# Patient Record
Sex: MALE | Race: WHITE | NOT HISPANIC OR LATINO | URBAN - METROPOLITAN AREA
[De-identification: names, ages, dates, MRNs, and addresses within clinical notes are randomized per-mention and may not be internally consistent; named-entity substitution may affect disease eponyms.]

---

## 2017-02-20 ENCOUNTER — COMMUNICATION - HEALTHEAST (OUTPATIENT)
Dept: INTERNAL MEDICINE | Facility: CLINIC | Age: 77
End: 2017-02-20

## 2017-02-20 DIAGNOSIS — I10 HYPERTENSION: ICD-10-CM

## 2017-05-03 ENCOUNTER — OFFICE VISIT - HEALTHEAST (OUTPATIENT)
Dept: INTERNAL MEDICINE | Facility: CLINIC | Age: 77
End: 2017-05-03

## 2017-05-03 DIAGNOSIS — R91.1 INCIDENTAL LUNG NODULE, > 3MM AND < 8MM: ICD-10-CM

## 2017-05-03 DIAGNOSIS — M79.89 LEG SWELLING: ICD-10-CM

## 2017-05-03 DIAGNOSIS — G25.9 MOVEMENT DISORDER: ICD-10-CM

## 2017-05-03 DIAGNOSIS — I10 ESSENTIAL HYPERTENSION: ICD-10-CM

## 2017-05-03 ASSESSMENT — MIFFLIN-ST. JEOR: SCORE: 1742.51

## 2017-05-04 ENCOUNTER — COMMUNICATION - HEALTHEAST (OUTPATIENT)
Dept: INTERNAL MEDICINE | Facility: CLINIC | Age: 77
End: 2017-05-04

## 2017-06-13 ENCOUNTER — COMMUNICATION - HEALTHEAST (OUTPATIENT)
Dept: INTERNAL MEDICINE | Facility: CLINIC | Age: 77
End: 2017-06-13

## 2017-06-13 DIAGNOSIS — I10 HYPERTENSION: ICD-10-CM

## 2017-08-10 ENCOUNTER — RECORDS - HEALTHEAST (OUTPATIENT)
Dept: ADMINISTRATIVE | Facility: OTHER | Age: 77
End: 2017-08-10

## 2017-08-15 ENCOUNTER — COMMUNICATION - HEALTHEAST (OUTPATIENT)
Dept: INTERNAL MEDICINE | Facility: CLINIC | Age: 77
End: 2017-08-15

## 2017-08-15 DIAGNOSIS — G25.9 MOVEMENT DISORDER: ICD-10-CM

## 2017-09-05 ENCOUNTER — OFFICE VISIT (OUTPATIENT)
Dept: NEUROLOGY | Facility: CLINIC | Age: 77
End: 2017-09-05

## 2017-09-05 ENCOUNTER — RECORDS - HEALTHEAST (OUTPATIENT)
Dept: ADMINISTRATIVE | Facility: OTHER | Age: 77
End: 2017-09-05

## 2017-09-05 VITALS — RESPIRATION RATE: 18 BRPM | DIASTOLIC BLOOD PRESSURE: 85 MMHG | SYSTOLIC BLOOD PRESSURE: 136 MMHG | HEART RATE: 69 BPM

## 2017-09-05 DIAGNOSIS — G25.9 EXTRAPYRAMIDAL DISORDER: Primary | ICD-10-CM

## 2017-09-05 DIAGNOSIS — R41.3 MEMORY LOSS: ICD-10-CM

## 2017-09-05 RX ORDER — AMLODIPINE BESYLATE 10 MG/1
TABLET ORAL
COMMUNITY
Start: 2017-06-19

## 2017-09-05 RX ORDER — POLYETHYLENE GLYCOL 3350 17 G/17G
17 POWDER, FOR SOLUTION ORAL
COMMUNITY
Start: 2015-09-19

## 2017-09-05 RX ORDER — HYDROCHLOROTHIAZIDE 25 MG/1
25 TABLET ORAL
COMMUNITY
Start: 2017-05-03

## 2017-09-05 ASSESSMENT — PAIN SCALES - GENERAL: PAINLEVEL: NO PAIN (0)

## 2017-09-05 NOTE — MR AVS SNAPSHOT
After Visit Summary   9/5/2017    Sánchez Castellanos    MRN: [id number]           Patient Information     Date Of Birth          1940        Visit Information        Provider Department      9/5/2017 1:30 PM Steven Bañuelos MD [facility]        Today's Diagnoses     Extrapyramidal disorder    -  1    Memory loss           Follow-ups after your visit        Follow-up notes from your care team     Discussed this visit Return in about 2 weeks (around 9/19/2017).      Your next 10 appointments already scheduled     Sep 19, 2017 11:30 AM CDT   Return Visit with Steven Bañuelos MD   [facility] ([facility])    [address]  [facility] [address] [phone]              Future tests that were ordered for you today     Open Future Orders        Priority Expected Expires Ordered    Vitamin B12 Routine 9/10/2017 9/5/2018 9/5/2017    Methylmalonic acid Routine 9/10/2017 9/5/2018 9/5/2017    Hepatic panel Routine 9/10/2017 9/5/2018 9/5/2017    MR Brain w/o Contrast Routine 9/12/2017 9/5/2018 9/5/2017            Who to contact     Please call your clinic at [phone] to:    Ask questions about your health    Make or cancel appointments    Discuss your medicines    Learn about your test results    Speak to your doctor   If you have compliments or concerns about an experience at your clinic, or if you wish to file a complaint, please contact [facility] Patient Relations at [phone] or email us at [email or web address]         Additional Information About Your Visit        MyChart Information     [employer] is an electronic gateway that provides easy, online access to your medical records. With [employer], you can request a clinic appointment, read your test results, renew a prescription or communicate with your care team.     To sign up for [employer]  visit the website at [email or web address]   You will be asked to enter the access code listed below, as well as some personal information. Please follow the directions to create your username and password.     Your access code is: [email or web address]  Expires: 2017  2:03 PM     Your access code will  in 90 days. If you need help or a new code, please contact your [facility] or call [phone] for assistance.        Care EveryWhere ID     This is your Care EveryWhere ID. This could be used by other organizations to access your [place] medical records  [id number]        Your Vitals Were     Pulse Respirations                69 18           Blood Pressure from Last 3 Encounters:   17 136/85    Weight from Last 3 Encounters:   No data found for Wt              We Performed the Following     TSH with free T4 reflex        Primary Care Provider Office Phone # Fax #    Telly MICHELLE Gold,  [phone]       [address] [address]        Equal Access to Services     : Hadii aad ku hadasho Soomaali, waaxda luqadaha, qaybta kaalmada adeegyada, waxay idiin hayaan chrissy solorio . So [facility] [phone].    ATENCIÓN: Si habla español, tiene a calzada disposición servicios gratuitos de asistencia lingüística. Llame al [phone].    We comply with applicable federal civil rights laws and [place] laws. We do not discriminate on the basis of race, color, national origin, age, disability sex, sexual orientation or gender identity.            Thank you!     Thank you for choosing [facility]  for your care. Our goal is always to provide you with excellent care. Hearing back from our patients is one way we can continue to improve our services. Please take a few minutes to complete the written survey that you may receive in the mail after your visit with us. Thank  you!             Your Updated Medication List - Protect others around you: Learn how to safely use, store and throw away your medicines at www.disposemymeds.org.          This list is accurate as of: 9/5/17  2:19 PM.  Always use your most recent med list.                   Brand Name Dispense Instructions for use Diagnosis    amLODIPine 10 MG tablet    NORVASC     TAKE ONE TABLET BY MOUTH DAILY    Extrapyramidal disorder, Memory loss       hydrochlorothiazide 25 MG tablet    HYDRODIURIL     Take 25 mg by mouth    Extrapyramidal disorder, Memory loss       polyethylene glycol Packet    MIRALAX/GLYCOLAX     Take 17 g by mouth    Extrapyramidal disorder, Memory loss

## 2017-09-05 NOTE — PROGRESS NOTES
"2017         Telly Kevin MD   [facility]   [address] [place] [address]      RE: Sánchez Castellanos   MRN: [id number]   : 1940      Dear Dr. Kevin:      Thank you for referring Sánchez Castellanos.  Mr. Castellanos is a 76-year-old male referred for evaluation of possible Parkinson's disease.      He indicates he has had trouble walking over the last 2-3 years.  He describes his gait as somewhat shuffling.  His biggest problem is turning, which he finds very difficult.  He has had occasional falls.  He has not appreciated a tremor.  He does report some left leg dysfunction that developed with leg swelling but has persisted since the swelling went down.  Initially told me he had no trouble with dexterity, but later indicated that he has some problems with his left hand dexterity.  He has had no hallucinations or delusions.  He denies anything that might represent a REM sleep behavioral disturbance.  He is not on any medications that might produce Parkinsonism.  He denies any trouble with bowel or bladder control.      He tells me his memory is not as sharp as it once was.      He has not had much in the way of workup, although he did have a normal basic metabolic panel in May.      His past history is notable for hypertension and a lung nodule which is being followed.      CURRENT MEDICATIONS:   1.  Amlodipine.   2.  Hydrochlorothiazide.   3.  MiraLax.      ALLERGIES:  He denies any medical allergies.      He is not aware of any family history of Parkinson's disease.      He is retired.  He smokes 3 cigars a day.  He does not use alcohol.      Examination reveals the patient's heart rate is 69.  Blood pressure 136/85.      He is alert.  He tends to move slowly.  He knows he is on the third floor of our building and remembered my last name began with an H.  He is otherwise oriented to place and time.  He made 1 error spelling the word \"world\" backward.  He recalled 2/3 objects.      His pupils " are miotic, measuring 2-3 mm, but both react.  Fundi are unremarkable.  He has full extraocular motility, but somewhat irregular pursuits.  There is no nystagmus.  Otherwise, cranial nerves II-XII are intact.  Motor examination reveals intact strength throughout.  On sensory testing, he has absent vibratory sense in the toes.  Position sense is intact.  He did not extinguish double simultaneous sensory stimuli.      The patient does have a facial stare and a furrowed brow.  He has reduced left hand dexterity with finger tapping movements.  He does not have a tremor either at rest or with postural maintenance or action.  He does have some slight cogwheeling in both arms, more so on the left than on the right.  He has increased tone in his neck.  Finger-to-nose is done accurately.  His gait is a bit complex.  He is not narrow-based.  He does tend to slightly favor the left leg.  He takes short steps.  He does not have a good arm swing bilaterally.  It took him 5 steps to turn.      Reflexes are 2+.  Plantar responses are difficult to  but the left may be extensor.      IMPRESSION:  Parkinsonism.      PLAN:  I explained to the patient and his wife, Ana, that he has findings of an extrapyramidal disorder, but a diagnosis of idiopathic Parkinson's disease cannot clearly be made.  I discussed that there are other conditions that can produce this picture and I felt that further evaluation was warranted.      I recommended a brain MRI scan.  He is not certain he wants to pursue that, but he did allow us to go ahead and schedule it today.      I am also going to check some blood work which will include a TSH, B12, methylmalonic acid level, and hepatic profile.      I plan to see him back in 2 weeks to review the available test results.     ADDENDUM 9/6/17: Patient called. He cancelled testing and follow up appointment.     Sincerely,         MD ANNA Carpenter MD             D: 09/05/2017 14:20    T: 2017 14:38   MT: al      Name:     DOMINGA BLANCO   MRN:      [id number]        Account:      [id number]   :      1940           Service Date: 2017      Document: [id number]

## 2017-09-05 NOTE — LETTER
2017       RE: Sánchez Castellanos  [address] [address]     Dear Colleague,    Thank you for referring your patient, Sánchez Castellanos, to the [facility] at [facility]. Please see a copy of my visit note below.    2017         Telly Kevin MD   [facility]   [address] [place] [address]      RE: Sánchez Castellanos   MRN: [id number]   : 1940      Dear Dr. Kevin:      Thank you for referring Sánchez Castellanos.  Mr. Castellanos is a 76-year-old male referred for evaluation of possible Parkinson's disease.      He indicates he has had trouble walking over the last 2-3 years.  He describes his gait as somewhat shuffling.  His biggest problem is turning, which he finds very difficult.  He has had occasional falls.  He has not appreciated a tremor.  He does report some left leg dysfunction that developed with leg swelling but has persisted since the swelling went down.  Initially told me he had no trouble with dexterity, but later indicated that he has some problems with his left hand dexterity.  He has had no hallucinations or delusions.  He denies anything that might represent a REM sleep behavioral disturbance.  He is not on any medications that might produce Parkinsonism.  He denies any trouble with bowel or bladder control.      He tells me his memory is not as sharp as it once was.      He has not had much in the way of workup, although he did have a normal basic metabolic panel in May.      His past history is notable for hypertension and a lung nodule which is being followed.      CURRENT MEDICATIONS:   1.  Amlodipine.   2.  Hydrochlorothiazide.   3.  MiraLax.      ALLERGIES:  He denies any medical allergies.      He is not aware of any family history of Parkinson's disease.      He is retired.  He smokes 3 cigars a day.  He does not use alcohol.      Examination reveals the patient's heart rate is 69.   "Blood pressure 136/85.      He is alert.  He tends to move slowly.  He knows he is on the third floor of our building and remembered my last name began with an H.  He is otherwise oriented to place and time.  He made 1 error spelling the word \"world\" backward.  He recalled 2/3 objects.      His pupils are miotic, measuring 2-3 mm, but both react.  Fundi are unremarkable.  He has full extraocular motility, but somewhat irregular pursuits.  There is no nystagmus.  Otherwise, cranial nerves II-XII are intact.  Motor examination reveals intact strength throughout.  On sensory testing, he has absent vibratory sense in the toes.  Position sense is intact.  He did not extinguish double simultaneous sensory stimuli.      The patient does have a facial stare and a furrowed brow.  He has reduced left hand dexterity with finger tapping movements.  He does not have a tremor either at rest or with postural maintenance or action.  He does have some slight cogwheeling in both arms, more so on the left than on the right.  He has increased tone in his neck.  Finger-to-nose is done accurately.  His gait is a bit complex.  He is not narrow-based.  He does tend to slightly favor the left leg.  He takes short steps.  He does not have a good arm swing bilaterally.  It took him 5 steps to turn.      Reflexes are 2+.  Plantar responses are difficult to  but the left may be extensor.      IMPRESSION:  Parkinsonism.      PLAN:  I explained to the patient and his wife, Ana, that he has findings of an extrapyramidal disorder, but a diagnosis of idiopathic Parkinson's disease cannot clearly be made.  I discussed that there are other conditions that can produce this picture and I felt that further evaluation was warranted.      I recommended a brain MRI scan.  He is not certain he wants to pursue that, but he did allow us to go ahead and schedule it today.      I am also going to check some blood work which will include a TSH, B12, " methylmalonic acid level, and hepatic profile.      I plan to see him back in 2 weeks to review the available test results.      Sincerely,         Steven Bañuelos MD            D: 2017 14:20   T: 2017 14:38   MT: al      Name:     DOMINGA BLANCO   MRN:      [id number]        Account:      [id number]   :      1940           Service Date: 2017      Document: [id number]

## 2017-09-14 ENCOUNTER — OFFICE VISIT (OUTPATIENT)
Dept: NEUROLOGY | Facility: CLINIC | Age: 77
End: 2017-09-14
Payer: COMMERCIAL

## 2017-09-14 ENCOUNTER — RECORDS - HEALTHEAST (OUTPATIENT)
Dept: ADMINISTRATIVE | Facility: OTHER | Age: 77
End: 2017-09-14

## 2017-09-14 VITALS
RESPIRATION RATE: 16 BRPM | HEART RATE: 72 BPM | WEIGHT: 189 LBS | SYSTOLIC BLOOD PRESSURE: 128 MMHG | TEMPERATURE: 97.7 F | DIASTOLIC BLOOD PRESSURE: 78 MMHG | OXYGEN SATURATION: 97 %

## 2017-09-14 DIAGNOSIS — G20.C PARKINSONISM, UNSPECIFIED PARKINSONISM TYPE (H): Primary | ICD-10-CM

## 2017-09-14 DIAGNOSIS — E53.8 VITAMIN B12 DEFICIENCY (NON ANEMIC): ICD-10-CM

## 2017-09-14 LAB — VIT B12 SERPL-MCNC: 146 PG/ML (ref 193–986)

## 2017-09-14 PROCEDURE — 80076 HEPATIC FUNCTION PANEL: CPT | Performed by: PSYCHIATRY & NEUROLOGY

## 2017-09-14 PROCEDURE — 84443 ASSAY THYROID STIM HORMONE: CPT | Performed by: PSYCHIATRY & NEUROLOGY

## 2017-09-14 PROCEDURE — 36415 COLL VENOUS BLD VENIPUNCTURE: CPT | Performed by: PSYCHIATRY & NEUROLOGY

## 2017-09-14 PROCEDURE — 83921 ORGANIC ACID SINGLE QUANT: CPT | Mod: 90 | Performed by: PSYCHIATRY & NEUROLOGY

## 2017-09-14 PROCEDURE — 99000 SPECIMEN HANDLING OFFICE-LAB: CPT | Performed by: PSYCHIATRY & NEUROLOGY

## 2017-09-14 PROCEDURE — 82607 VITAMIN B-12: CPT | Performed by: PSYCHIATRY & NEUROLOGY

## 2017-09-14 PROCEDURE — 82390 ASSAY OF CERULOPLASMIN: CPT | Performed by: PSYCHIATRY & NEUROLOGY

## 2017-09-14 PROCEDURE — 99205 OFFICE O/P NEW HI 60 MIN: CPT | Performed by: PSYCHIATRY & NEUROLOGY

## 2017-09-14 NOTE — MR AVS SNAPSHOT
After Visit Summary   9/14/2017    Sánchez Castellanos    MRN: [id number]           Patient Information     Date Of Birth          1940        Visit Information        Provider Department      9/14/2017 1:30 PM Andrés Panda MD [facility]        Today's Diagnoses     Parkinsonism, unspecified Parkinsonism type (H)    -  1      Care Instructions    AFTER VISIT SUMMARY (AVS):    At today's visit we discussed various diagnostic possibilities for your symptoms and the reasons for work-up, which includes:  Orders Placed This Encounter   Procedures     MR Brain w/o Contrast     TSH with free T4 reflex     Vitamin B12     Methylmalonic acid     Ceruloplasmin     Hepatic panel (Albumin, ALT, AST, Bili, Alk Phos, TP)     No new medications were ordered.    Preventive Neurology: Encouraged to stay physically and mentally active with particular emphasis on daily mentally stimulating activities of your choice (such as crosswords, puzzles, sudoku, etc.), stretching exercises, walking, and healthy eating.    Additional recommendations after the work-up.    Next follow-up appointment is in next 2-4 weeks or earlier if needed.    Please do not hesitate to call me with any questions or concerns.    Thanks.            Follow-ups after your visit        Your next 10 appointments already scheduled     Sep 23, 2017  1:00 PM CDT   (Arrive by 12:45 PM)   MR BRAIN W/O CONTRAST with [address] [facility] ([facility])    [address] [place] [address] [phone]           Take your medicines as usual, unless your doctor tells you not to. Bring a list of your current medicines to your exam (including vitamins, minerals and over-the-counter drugs). Also bring the results of similar scans you may have had.  Please remove any body piercings and hair extensions before you arrive.  Follow your doctor s orders. If you do not, we  may have to postpone your exam.  You will not have contrast for this exam. You do not need to do anything special to prepare.  The MRI machine uses a strong magnet. Please wear clothes without metal (snaps, zippers). A sweatsuit works well, or we may give you a hospital gown.   **IMPORTANT** THE INSTRUCTIONS BELOW ARE ONLY FOR THOSE PATIENTS WHO HAVE BEEN TOLD THEY WILL RECEIVE SEDATION OR GENERAL ANESTHESIA DURING THEIR MRI PROCEDURE:  IF YOU WILL RECEIVE SEDATION (take medicine to help you relax during your exam):   You must get the medicine from your doctor before you arrive. Bring the medicine to the exam. Do not take it at home.   Arrive one hour early. Bring someone who can take you home after the test. Your medicine will make you sleepy. After the exam, you may not drive, take a bus or take a taxi by yourself.   No eating 8 hours before your exam. You may have clear liquids up until 4 hours before your exam. (Clear liquids include water, clear tea, black coffee and fruit juice without pulp.)  IF YOU WILL RECEIVE ANESTHESIA (be asleep for your exam):   Arrive 1 1/2 hours early. Bring someone who can take you home after the test. You may not drive, take a bus or take a taxi by yourself.   No eating 8 hours before your exam. You may have clear liquids up until 4 hours before your exam. (Clear liquids include water, clear tea, black coffee and fruit juice without pulp.)   You will spend four to five hours in the recovery room.  Please call the Imaging Department at your exam site with any questions.            Oct 05, 2017  1:00 PM CDT   Return Visit with Andrés Panda MD   [facility] ([facility])    [address] [address] [phone]              Future tests that were ordered for you today     Open Future Orders        Priority Expected Expires Ordered    MR Brain w/o Contrast Routine  9/14/2018 9/14/2017            Who to contact   "   If you have questions or need follow up information about today's clinic visit or your schedule please contact [facility] directly at [phone].  Normal or non-critical lab and imaging results will be communicated to you by MyChart, letter or phone within 4 business days after the clinic has received the results. If you do not hear from us within 7 days, please contact the clinic through [employer] or phone. If you have a critical or abnormal lab result, we will notify you by phone as soon as possible.  Submit refill requests through [employer] or call your pharmacy and they will forward the refill request to us. Please allow 3 business days for your refill to be completed.          Additional Information About Your Visit        [employer] Information     [employer] lets you send messages to your doctor, view your test results, renew your prescriptions, schedule appointments and more. To sign up, go to [email or web address] . Click on \"Log in\" on the left side of the screen, which will take you to the Welcome page. Then click on \"Sign up Now\" on the right side of the page.     You will be asked to enter the access code listed below, as well as some personal information. Please follow the directions to create your username and password.     Your access code is: [email or web address]  Expires: 2017  2:03 PM     Your access code will  in 90 days. If you need help or a new code, please call your [place] clinic or [phone].        Care EveryWhere ID     This is your Care EveryWhere ID. This could be used by other organizations to access your [place] medical records  [id number]        Your Vitals Were     Pulse Temperature Respirations Pulse Oximetry          72 97.7  F (36.5  C) (Oral) 16 97%         Blood Pressure from Last 3 Encounters:   17 128/78   17 136/85    Weight from Last 3 Encounters:   17 85.7 kg (189 lb)              We Performed the Following     Ceruloplasmin     " Hepatic panel (Albumin, ALT, AST, Bili, Alk Phos, TP)     Methylmalonic acid     TSH with free T4 reflex     Vitamin B12        Primary Care Provider Office Phone # Fax #    Telly Gold,  [phone]       [facility] [address] [facility] [address]        Equal Access to Services     ZULMA ANGELO : Hadii aad ku hadasho Soomaali, waaxda luqadaha, qaybta kaalmada adeegyada, waxay idiin hayaan adeeg kharash la'aan . So [facility] [phone].    ATENCIÓN: Si habla español, tiene a calzada disposición servicios gratuitos de asistencia lingüística. Shayame al [phone].    We comply with applicable federal civil rights laws and [place] laws. We do not discriminate on the basis of race, color, national origin, age, disability sex, sexual orientation or gender identity.            Thank you!     Thank you for choosing [facility]  for your care. Our goal is always to provide you with excellent care. Hearing back from our patients is one way we can continue to improve our services. Please take a few minutes to complete the written survey that you may receive in the mail after your visit with us. Thank you!             Your Updated Medication List - Protect others around you: Learn how to safely use, store and throw away your medicines at www.disposemymeds.org.          This list is accurate as of: 9/14/17  2:19 PM.  Always use your most recent med list.                   Brand Name Dispense Instructions for use Diagnosis    amLODIPine 10 MG tablet    NORVASC     TAKE ONE TABLET BY MOUTH DAILY    Extrapyramidal disorder, Memory loss       hydrochlorothiazide 25 MG tablet    HYDRODIURIL     Take 25 mg by mouth    Extrapyramidal disorder, Memory loss       polyethylene glycol Packet    MIRALAX/GLYCOLAX     Take 17 g by mouth    Extrapyramidal disorder, Memory loss

## 2017-09-14 NOTE — NURSING NOTE
COGNITIVE SCREEN  1) Repeat 3 items (Banana, Sunrise, Chair)    2) Clock draw: NORMAL  3) 3 item recall: Recalls 2 objects   Results: NORMAL clock, 1-2 items recalled: COGNITIVE IMPAIRMENT LESS LIKELY    Mini-CogTM Copyright S [employer]. Licensed by the author for use in [facility]; reprinted with permission (abilio@[facility]). All rights reserved.          Chief Complaint   Patient presents with     Consult     movement disorder        Initial /78 (BP Location: Right arm, Patient Position: Chair, Cuff Size: Adult Regular)  Pulse 72  Temp 97.7  F (36.5  C) (Oral)  Resp 16  Wt 85.7 kg (189 lb)  SpO2 97% There is no height or weight on file to calculate BMI.  Medication Reconciliation: complete       Liset Lovell MA

## 2017-09-14 NOTE — PATIENT INSTRUCTIONS
AFTER VISIT SUMMARY (AVS):    At today's visit we discussed various diagnostic possibilities for your symptoms and the reasons for work-up, which includes:  Orders Placed This Encounter   Procedures     MR Brain w/o Contrast     TSH with free T4 reflex     Vitamin B12     Methylmalonic acid     Ceruloplasmin     Hepatic panel (Albumin, ALT, AST, Bili, Alk Phos, TP)     No new medications were ordered.    Preventive Neurology: Encouraged to stay physically and mentally active with particular emphasis on daily mentally stimulating activities of your choice (such as crosswords, puzzles, sudoku, etc.), stretching exercises, walking, and healthy eating.    Additional recommendations after the work-up.    Next follow-up appointment is in the next 2-4 weeks or earlier if needed.    Please do not hesitate to call me with any questions or concerns.    Thanks.

## 2017-09-15 LAB
ALBUMIN SERPL-MCNC: 4 G/DL (ref 3.4–5)
ALP SERPL-CCNC: 77 U/L (ref 40–150)
ALT SERPL W P-5'-P-CCNC: 15 U/L (ref 0–70)
AST SERPL W P-5'-P-CCNC: 8 U/L (ref 0–45)
BILIRUB DIRECT SERPL-MCNC: 0.2 MG/DL (ref 0–0.2)
BILIRUB SERPL-MCNC: 0.6 MG/DL (ref 0.2–1.3)
PROT SERPL-MCNC: 7.4 G/DL (ref 6.8–8.8)
TSH SERPL DL<=0.005 MIU/L-ACNC: 0.83 MU/L (ref 0.4–4)

## 2017-09-16 LAB — METHYLMALONATE SERPL-SCNC: 0.36 UMOL/L (ref 0–0.4)

## 2017-09-18 LAB — CERULOPLASMIN SERPL-MCNC: 29 MG/DL (ref 23–53)

## 2017-10-05 ENCOUNTER — RECORDS - HEALTHEAST (OUTPATIENT)
Dept: ADMINISTRATIVE | Facility: OTHER | Age: 77
End: 2017-10-05

## 2017-10-05 ENCOUNTER — OFFICE VISIT (OUTPATIENT)
Dept: NEUROLOGY | Facility: CLINIC | Age: 77
End: 2017-10-05
Payer: COMMERCIAL

## 2017-10-05 VITALS
OXYGEN SATURATION: 99 % | SYSTOLIC BLOOD PRESSURE: 129 MMHG | TEMPERATURE: 98.1 F | RESPIRATION RATE: 16 BRPM | HEART RATE: 77 BPM | DIASTOLIC BLOOD PRESSURE: 81 MMHG | WEIGHT: 190.6 LBS

## 2017-10-05 DIAGNOSIS — E53.8 VITAMIN B12 DEFICIENCY (NON ANEMIC): ICD-10-CM

## 2017-10-05 DIAGNOSIS — G20.C PARKINSONISM, UNSPECIFIED PARKINSONISM TYPE (H): Primary | ICD-10-CM

## 2017-10-05 PROCEDURE — 99214 OFFICE O/P EST MOD 30 MIN: CPT | Performed by: PSYCHIATRY & NEUROLOGY

## 2017-10-05 RX ORDER — CARBIDOPA AND LEVODOPA 25; 100 MG/1; MG/1
1 TABLET ORAL 3 TIMES DAILY
Qty: 90 TABLET | Refills: 3 | Status: SHIPPED | OUTPATIENT
Start: 2017-10-05 | End: 2018-01-04

## 2017-10-05 NOTE — NURSING NOTE
Chief Complaint   Patient presents with     Follow Up For     MRI       Initial /81 (BP Location: Left arm, Patient Position: Chair, Cuff Size: Adult Large)  Pulse 77  Temp 98.1  F (36.7  C) (Oral)  Resp 16  Wt 86.5 kg (190 lb 9.6 oz)  SpO2 99% There is no height or weight on file to calculate BMI.  Medication Reconciliation: complete     Kacie Rice, CMA

## 2017-10-05 NOTE — MR AVS SNAPSHOT
After Visit Summary   10/5/2017    Sánchez Castellanos    MRN: [id number]           Patient Information     Date Of Birth          1940        Visit Information        Provider Department      10/5/2017 1:00 PM Andrés Panda MD [facility]        Today's Diagnoses     Parkinsonism, unspecified Parkinsonism type (H)    -  1      Care Instructions    AFTER VISIT SUMMARY (AVS):    At today's visit we discussed brain MRA results, current symptoms, and plan, which will include Sinemet trial: Carbidopa-levodopa (SINEMET)  MG: Take 1 tablet by mouth 3 times daily. Side effects were discussed. Please contact my office if you develop any of intolerable side effects.    We also discussed physical therapy, please contact my office when you are ready to proceed.    Please, recheck your vitamin B12 level in 1 month after starting the replacement.    Next follow-up appointment is in next 3 months or earlier if needed.    Please do not hesitate to call me with any questions or concerns.    Thanks.          Follow-ups after your visit        Follow-up notes from your care team     Return in about 3 months (around 1/5/2018).      Your next 10 appointments already scheduled     Jan 04, 2018 11:30 AM CST   Return Visit with Andrés Panda MD   [facility]    [address] [place] [id number] [phone]              Who to contact     If you have questions or need follow up information about today's clinic visit or your schedule please contact [facility] directly at [phone].  Normal or non-critical lab and imaging results will be communicated to you by MyChart, letter or phone within 4 business days after the clinic has received the results. If you do not hear from us within 7 days, please contact the clinic through MyChart or phone. If you have a critical or abnormal lab  "result, we will notify you by phone as soon as possible.  Submit refill requests through [facility] or call your pharmacy and they will forward the refill request to us. Please allow 3 business days for your refill to be completed.          Additional Information About Your Visit        The [facility] Information     [facility] lets you send messages to your doctor, view your test results, renew your prescriptions, schedule appointments and more. To sign up, go to [email or web address] . Click on \"Log in\" on the left side of the screen, which will take you to the Welcome page. Then click on \"Sign up Now\" on the right side of the page.     You will be asked to enter the access code listed below, as well as some personal information. Please follow the directions to create your username and password.     Your access code is: [email or web address]  Expires: 2017  2:03 PM     Your access code will  in 90 days. If you need help or a new code, please call your [place] clinic or [phone].        Care EveryWhere ID     This is your Care EveryWhere ID. This could be used by other organizations to access your [place] medical records  [id number]        Your Vitals Were     Pulse Temperature Respirations Pulse Oximetry          77 98.1  F (36.7  C) (Oral) 16 99%         Blood Pressure from Last 3 Encounters:   10/05/17 129/81   17 128/78   17 136/85    Weight from Last 3 Encounters:   10/05/17 86.5 kg (190 lb 9.6 oz)   17 85.7 kg (189 lb)              Today, you had the following     No orders found for display         Today's Medication Changes          These changes are accurate as of: 10/5/17  1:18 PM.  If you have any questions, ask your nurse or doctor.               Start taking these medicines.        Dose/Directions    carbidopa-levodopa  MG per tablet   Commonly known as:  SINEMET   Used for:  Parkinsonism, unspecified Parkinsonism type (H)   Started by:  Andrés Panda MD     "    Dose:  1 tablet   Take 1 tablet by mouth 3 times daily   Quantity:  90 tablet   Refills:  3            Where to get your medicines      These medications were sent to [id number] - [place], [place] - [address] AT [facility]  [address] [place] [address]    Hours:  24-hours Phone:  [phone]     carbidopa-levodopa  MG per tablet                Primary Care Provider Office Phone # Fax #    Telly MARTINEZ Do Kevin,  [phone]       [facility] [address] [facility] [address]        Equal Access to Services     LUISA ANGELO : Hadii aad ku hadasho Soomaali, waaxda luqadaha, qaybta kaalmada adeegyada, waxay idiin hayaan adeedna syed. [facility] [phone].    ATENCIÓN: Si habla español, tiene a calzada disposición servicios gratuitos de asistencia lingüística. [facility] [phone].    We comply with applicable federal civil rights laws and [place] laws. We do not discriminate on the basis of race, color, national origin, age, disability, sex, sexual orientation, or gender identity.            Thank you!     Thank you for choosing [facility]  for your care. Our goal is always to provide you with excellent care. Hearing back from our patients is one way we can continue to improve our services. Please take a few minutes to complete the written survey that you may receive in the mail after your visit with us. Thank you!             Your Updated Medication List - Protect others around you: Learn how to safely use, store and throw away your medicines at www.disposemymeds.org.          This list is accurate as of: 10/5/17  1:18 PM.  Always use your most recent med list.                   Brand Name Dispense Instructions for use Diagnosis    amLODIPine 10 MG tablet    NORVASC     TAKE ONE TABLET BY MOUTH DAILY    Extrapyramidal disorder, Memory loss       B-12 1000 MCG Tbcr     100 tablet    Take 1,000 mcg by mouth  daily    Vitamin B12 deficiency (non anemic)       carbidopa-levodopa  MG per tablet    SINEMET    90 tablet    Take 1 tablet by mouth 3 times daily    Parkinsonism, unspecified Parkinsonism type (H)       hydrochlorothiazide 25 MG tablet    HYDRODIURIL     Take 25 mg by mouth    Extrapyramidal disorder, Memory loss       polyethylene glycol Packet    MIRALAX/GLYCOLAX     Take 17 g by mouth    Extrapyramidal disorder, Memory loss

## 2017-10-05 NOTE — PATIENT INSTRUCTIONS
AFTER VISIT SUMMARY (AVS):    At today's visit we discussed brain MRA results, current symptoms, and plan, which will include Sinemet trial: Carbidopa-levodopa (SINEMET)  MG: Take 1 tablet by mouth 3 times daily. Side effects were discussed. Please contact my office if you develop any of intolerable side effects.    We also discussed physical therapy, please contact my office when you are ready to proceed.    Please, recheck your vitamin B12 level in 1 month after starting the replacement.    Next follow-up appointment is in the next 3 months or earlier if needed.    Please do not hesitate to call me with any questions or concerns.    Thanks.

## 2017-10-05 NOTE — PROGRESS NOTES
ESTABLISHED PATIENT NEUROLOGY NOTE    DATE OF VISIT: 10/5/2017  CLINIC LOCATION: [facility]  MRN: [id number]  PATIENT NAME: Sánchez Castellanos  YOB: 1940    PCP: Telly Gold DO    REASON FOR VISIT:   Chief Complaint   Patient presents with     Follow Up For     MRI     SUBJECTIVE:                                                      HISTORY OF PRESENT ILLNESS: Patient is here for follow up regarding his parkinsonism. Please refer to my initial note from 09/14/2017 for further information.    Since the last visit, the patient reports interval worsening of his symptoms by the end of the day. He continues to experience difficulty with his gait (shuffling, unsteady, with hesitations), bradykinesia, hypophonia, and drooling. He denies interval development of the new focal neurological symptoms. He completed the recommended workup.    On review of systems, patient endorses no new active complaints. Medications, allergies, family and social history were also reviewed. There are no changes reported by patient.    CURRENT MEDICATIONS:   Current Outpatient Prescriptions on File Prior to Visit:  Cyanocobalamin (B-12) 1000 MCG TBCR Take 1,000 mcg by mouth daily   amLODIPine (NORVASC) 10 MG tablet TAKE ONE TABLET BY MOUTH DAILY   hydrochlorothiazide (HYDRODIURIL) 25 MG tablet Take 25 mg by mouth   polyethylene glycol (MIRALAX/GLYCOLAX) Packet Take 17 g by mouth     REVIEW OF SYSTEMS:                                                    10-system review was completed. Pertinent positives are included in HPI. The remainder of ROS is negative.  EXAM:                                                    Physical Exam:   Vitals: /81 (BP Location: Left arm, Patient Position: Chair, Cuff Size: Adult Large)  Pulse 77  Temp 98.1  F (36.7  C) (Oral)  Resp 16  Wt 86.5 kg (190 lb 9.6 oz)  SpO2 99%    General: pt is in NAD, cooperative.  Skin: normal turgor, moist mucous membranes, no lesions/rashes  noticed.  HEENT: ATNC, white sclera, normal conjunctiva.  Respiratory:Symmetric lung excursion, no accessory respiratory muscle use.  Abdomen: Non distended.  Neurological: awake, cooperative, follows commands, no aphasia or dysarthria noted, cranial nerves II-XII: no ptosis, extraocular motility is full, face is symmetric, hearing is reduced bilaterally, mild hypophonia, hypomimia, and decreased blinking rate, tongue is midline, equally moves all extremities, cogwheel rigidity, finger tapping is slowed on the left, sensation to the light touch is intact bilaterally, no dysmetria bilaterally, stooped posture, gait is shuffling with reduced arm swings.    DATA:     Labs: I personally reviewed the following labs:  Office Visit on 09/14/2017   Component Date Value Ref Range Status     TSH 09/14/2017 0.83  0.40 - 4.00 mU/L Final     Vitamin B12 09/14/2017 146* 193 - 986 pg/mL Final     Methylmalonic Acid 09/14/2017 0.36  0.00 - 0.40 umol/L Final    Comment: (Note)  INTERPRETIVE INFORMATION: MMA Serum/Plasma,                            Vitamin B12 Status  Test developed and characteristics determined by [facility]. See Compliance Statement B: [facility]iDiDi[facility]/CS  Performed by [facility],  [address] [place] [address] [phone]  [email or web address], Sachin Alvarez MD, Lab. Director       Ceruloplasmin 09/14/2017 29  23 - 53 mg/dL Final     Bilirubin Direct 09/14/2017 0.2  0.0 - 0.2 mg/dL Final     Bilirubin Total 09/14/2017 0.6  0.2 - 1.3 mg/dL Final     Albumin 09/14/2017 4.0  3.4 - 5.0 g/dL Final     Protein Total 09/14/2017 7.4  6.8 - 8.8 g/dL Final     Alkaline Phosphatase 09/14/2017 77  40 - 150 U/L Final     ALT 09/14/2017 15  0 - 70 U/L Final     AST 09/14/2017 8  0 - 45 U/L Final     Imaging: I also personally reviewed following brain MRI from and agree with the formal radiology report as follows:  1. No structural abnormality noted.  2. Moderate leukoaraiosis and mild cerebral parenchymal volume  loss.    ASSESSMENT and PLAN:                                                    Assessment:  77-year-old male patient with parkinsonism presents for follow-up after the completion of workup. He reports that his symptoms are worse by the end of the day. His B12 was low at 146, and he started replacement on my advice. We should recheck his B12 level in 1 month. The rest of his labs were unremarkable. Brain MRI demonstrated moderate small vessel ischemic disease and mild cerebral atrophy without any structural abnormalities. I would like to proceed with a Sinemet trial to see patient's response. I would favor the diagnosis of Parkinson's disease over the other parkinsonism conditions, though they will still remain in the differential for now. We reviewed the common and serious side effects of the Sinemet, that include, but not limited to somnolence, dizziness, confusion, headache, hallucinations, orthostatic hypotension, nausea, emesis, agitation, abnormal dreams, impulsive behaviors, and psychosis. I advised the patient to contact my office if he develops any of intolerable side effects. The rest of the plan is detailed below.    Diagnoses:    ICD-10-CM    1. Parkinsonism, unspecified Parkinsonism type (H) G20 carbidopa-levodopa (SINEMET)  MG per tablet   2. Vitamin B12 deficiency (non anemic) E53.8      Plan: At today's visit we thoroughly discussed brain MRA results, current symptoms, and plan, which will include Sinemet trial: Carbidopa-levodopa (SINEMET)  MG: Take 1 tablet by mouth 3 times daily.     We also discussed physical therapy, I recommended the patient to contact my office when he is ready to proceed.    We will recheck his vitamin B12 level in 1 month after starting the replacement.    Next follow-up appointment is in the next 3 months or earlier if needed.    I encouraged the patient to call me with any questions or concerns.    Total Time: 37 minutes with > 50% spent counseling the patient  and his wife on stated above assessment and recommendations, including nature of the diagnosis, completed w/u, proposed plan of treatment, and prognosis. Extra time was used to answer their questions.    Andrés Panda MD  HP/ Neurology

## 2017-11-30 DIAGNOSIS — E53.8 VITAMIN B12 DEFICIENCY (NON ANEMIC): ICD-10-CM

## 2017-11-30 LAB — VIT B12 SERPL-MCNC: 399 PG/ML (ref 193–986)

## 2017-11-30 PROCEDURE — 82607 VITAMIN B-12: CPT | Performed by: PSYCHIATRY & NEUROLOGY

## 2017-11-30 PROCEDURE — 36415 COLL VENOUS BLD VENIPUNCTURE: CPT | Performed by: PSYCHIATRY & NEUROLOGY

## 2018-01-04 ENCOUNTER — RECORDS - HEALTHEAST (OUTPATIENT)
Dept: ADMINISTRATIVE | Facility: OTHER | Age: 78
End: 2018-01-04

## 2018-01-04 ENCOUNTER — OFFICE VISIT (OUTPATIENT)
Dept: NEUROLOGY | Facility: CLINIC | Age: 78
End: 2018-01-04
Payer: COMMERCIAL

## 2018-01-04 VITALS
HEART RATE: 80 BPM | SYSTOLIC BLOOD PRESSURE: 128 MMHG | TEMPERATURE: 98.4 F | OXYGEN SATURATION: 98 % | WEIGHT: 195.6 LBS | RESPIRATION RATE: 18 BRPM | DIASTOLIC BLOOD PRESSURE: 68 MMHG

## 2018-01-04 DIAGNOSIS — G20.C PARKINSONISM, UNSPECIFIED PARKINSONISM TYPE (H): ICD-10-CM

## 2018-01-04 PROCEDURE — 99214 OFFICE O/P EST MOD 30 MIN: CPT | Performed by: PSYCHIATRY & NEUROLOGY

## 2018-01-04 RX ORDER — CARBIDOPA AND LEVODOPA 25; 100 MG/1; MG/1
1 TABLET ORAL 4 TIMES DAILY
Qty: 120 TABLET | Refills: 0 | Status: SHIPPED | OUTPATIENT
Start: 2018-01-04 | End: 2018-02-01

## 2018-01-04 NOTE — LETTER
"    1/4/2018         RE: Sánchez Castellanos  [address] [facility] [address]        Dear Colleague,    Thank you for referring your patient, Sánchez Castellanos, to the [facility]. Please see a copy of my visit note below.    ESTABLISHED PATIENT NEUROLOGY NOTE    DATE OF VISIT: 1/4/2018  CLINIC LOCATION: [facility]  MRN: [id number]  PATIENT NAME: Sánchez Castellanos  YOB: 1940    PCP: Telly Gold DO    REASON FOR VISIT:   Chief Complaint   Patient presents with     Follow Up For     Parkinsonism     SUBJECTIVE:                                                      HISTORY OF PRESENT ILLNESS: Patient is here for follow up regarding parkinsonism.  He was last seen on 10/05/2017.  At that time, he was started on Sinemet.  Please refer to my initial/other prior notes for further information.  The patient is accompanied by his wife today.    Since the last visit, the patient reports that he did not notice significant change in his symptoms after starting Sinemet.  He denies any significant side effects.  Ran out of Sinemet today, and did not refill it thinking that his dose might change after he sees me.  He continues to experience gait difficulty and bradykinesia.  No hand tremor.  The patient denies any interval development of new focal neurological symptoms.    Patient's wife reports that she noticed a difference, but \"not a dramatic change\", with Sinemet.  The patient is more active and does more chores around the house.  It looks like it is easier to move for him on the medication.    On review of systems, patient endorses no other acute complaints. Medications, allergies, family and social history were also reviewed. There are no changes reported by patient.    CURRENT MEDICATIONS:   Current Outpatient Prescriptions on File Prior to Visit:  amLODIPine (NORVASC) 10 MG tablet TAKE ONE TABLET BY MOUTH DAILY   hydrochlorothiazide (HYDRODIURIL) 25 MG tablet Take 25 mg by mouth "   polyethylene glycol (MIRALAX/GLYCOLAX) Packet Take 17 g by mouth   [DISCONTINUED] carbidopa-levodopa (SINEMET)  MG per tablet Take 1 tablet by mouth 3 times daily   Cyanocobalamin (B-12) 1000 MCG TBCR Patient not taking: Reported on 1/4/2018.     REVIEW OF SYSTEMS:                                                    10-system review was completed. Pertinent positives are included in HPI. The remainder of ROS is negative.  EXAM:                                                    Physical Exam:   Vitals: /68 (BP Location: Right arm, Patient Position: Sitting, Cuff Size: Adult Large)  Pulse 80  Temp 98.4  F (36.9  C) (Oral)  Resp 18  Wt 88.7 kg (195 lb 9.6 oz)  SpO2 98%    General: pt is in NAD, cooperative.  Skin: normal turgor, moist mucous membranes, no lesions/rashes noticed.  HEENT: ATNC, white sclera, normal conjunctiva.  Respiratory: Symmetric lung excursion, no accessory respiratory muscle use.  Abdomen: Non distended.  Neurological: awake, cooperative, follows commands, no aphasia or dysarthria noted, cranial nerves II-XII: no ptosis, extraocular motility is full, face is symmetric, hearing is bilaterally reduced, mild hypophonia and hypomimia, tongue is midline, equally moves all extremities, tone is increased in left upper extremity, no dysmetria bilaterally, stooped posture, makes shorter steps with reduced arm swings while walking, takes 3-4 steps to turn.    DATA:     Labs: I personally reviewed the following labs:  Orders Only on 11/30/2017   Component Date Value Ref Range Status     Vitamin B12 11/30/2017 399  193 - 986 pg/mL Final   Imaging: No new pertinent imaging.  ASSESSMENT and PLAN:                                                    Assessment: 77-year-old male patient presents for follow-up of his extrapyramidal disorder, likely consistent with Parkinson's disease.  The patient did not notice significant improvement on Sinemet, though his wife mentions that he moves more freely  "and is more active.  I think we need to further increase his Sinemet dose gradually.  We discussed that the doses around 2 tablets 4 times daily are sometimes needed to achieve noticeable benefit.  Once his Sinemet dose is adjusted, we will also discuss \"Big and Loud\" program (starting at the next visit).  The rest of our discussion and the plan are detailed below.    Diagnoses:    ICD-10-CM    1. Parkinsonism, unspecified Parkinsonism type (H) G20 carbidopa-levodopa (SINEMET)  MG per tablet     Plan: At today's visit we thoroughly discussed patient's current symptoms, treatment options, and the plan.    The following medications were changed: Carbidopa-levodopa (SINEMET)  M tablet by mouth 4 times daily.  Side effects were discussed.  I advised the patient to contact my clinic if he develops any of intolerable side effects.    Next follow-up appointment is in the next 4 weeks or earlier if needed.    I encouraged the patient to call me with any questions or concerns.    Total Time: 28 minutes with > 50% spent counseling the patient and his wife on stated above assessment and recommendations, including review of current symptoms, proposed plan of treatment, and prognosis.  Additional time was used to answer their questions.    Andrés Panda MD  / Neurology      Again, thank you for allowing me to participate in the care of your patient.        Sincerely,        Andrés Panda MD    "

## 2018-01-04 NOTE — MR AVS SNAPSHOT
After Visit Summary   1/4/2018    Sánchez Castellanos    MRN: [id number]           Patient Information     Date Of Birth          1940        Visit Information        Provider Department      1/4/2018 11:30 AM Andrés Panda MD [facility]        Today's Diagnoses     Parkinsonism, unspecified Parkinsonism type (H)          Care Instructions    AFTER VISIT SUMMARY (AVS):    At today's visit we thoroughly discussed your current symptoms, treatment options, and the plan.    The following medications were changed: Carbidopa-levodopa (SINEMET)  MG: Take 1 tablet by mouth 4 times daily.  Side effects were discussed.  Please contact my clinic if you have any of intolerable side effects.    Next follow-up appointment is in the next 4 weeks or earlier if needed.    Please do not hesitate to call me with any questions or concerns.    Thanks.            Follow-ups after your visit        Follow-up notes from your care team     Return in about 4 weeks (around 2/1/2018).      Your next 10 appointments already scheduled     Feb 01, 2018 10:30 AM CST   Return Visit with Andrés Panda MD   [facility] ([facility]    [address] [place] [address] [phone]              Who to contact     If you have questions or need follow up information about today's clinic visit or your schedule please contact [facility] directly at [phone].  Normal or non-critical lab and imaging results will be communicated to you by MyChart, letter or phone within 4 business days after the clinic has received the results. If you do not hear from us within 7 days, please contact the clinic through MyChart or phone. If you have a critical or abnormal lab result, we will notify you by phone as soon as possible.  Submit refill requests through [employer]app[employer]link or call your pharmacy and they will forward the refill  "request to us. Please allow 3 business days for your refill to be completed.          Additional Information About Your Visit        MyChart Information     [employer] lets you send messages to your doctor, view your test results, renew your prescriptions, schedule appointments and more. To sign up, go to [email or web address] . Click on \"Log in\" on the left side of the screen, which will take you to the Welcome page. Then click on \"Sign up Now\" on the right side of the page.     You will be asked to enter the access code listed below, as well as some personal information. Please follow the directions to create your username and password.     Your access code is: [email or web address]  Expires: 2018 11:48 AM     Your access code will  in 90 days. If you need help or a new code, please call your [place] clinic or [phone].        Care EveryWhere ID     This is your Care EveryWhere ID. This could be used by other organizations to access your [place] medical records  [id number]        Your Vitals Were     Pulse Temperature Respirations Pulse Oximetry          80 98.4  F (36.9  C) (Oral) 18 98%         Blood Pressure from Last 3 Encounters:   18 128/68   10/05/17 129/81   17 128/78    Weight from Last 3 Encounters:   18 88.7 kg (195 lb 9.6 oz)   10/05/17 86.5 kg (190 lb 9.6 oz)   17 85.7 kg (189 lb)              Today, you had the following     No orders found for display         Today's Medication Changes          These changes are accurate as of: 18 11:49 AM.  If you have any questions, ask your nurse or doctor.               These medicines have changed or have updated prescriptions.        Dose/Directions    carbidopa-levodopa  MG per tablet   Commonly known as:  SINEMET   This may have changed:  when to take this   Used for:  Parkinsonism, unspecified Parkinsonism type (H)   Changed by:  Andrés Panda MD        Dose:  1 tablet   Take 1 tablet by mouth 4 " times daily   Quantity:  120 tablet   Refills:  0            Where to get your medicines      These medications were sent to [id number] - [place], [place] - [address] AT [facility]  [address], [facility] [address]    Hours:  24-hours Phone:  [phone]     carbidopa-levodopa  MG per tablet                Primary Care Provider Office Phone # Fax #    Telly MARTINEZ Do [place],  [phone]       [facility] [address] [facility] [address]        Equal Access to Services     [facility] : Hadii aad ku hadasho Soomaali, waaxda luqadaha, qaybta kaalmada adeegyada, waxay idiin hayaan adeeg kharabess lateetee . So [facility] [phone].    ATENCIÓN: Si habla español, tiene a calzada disposición servicios gratuitos de asistencia lingüística. [facility] [phone].    We comply with applicable federal civil rights laws and [place] laws. We do not discriminate on the basis of race, color, national origin, age, disability, sex, sexual orientation, or gender identity.            Thank you!     Thank you for choosing [facility]  for your care. Our goal is always to provide you with excellent care. Hearing back from our patients is one way we can continue to improve our services. Please take a few minutes to complete the written survey that you may receive in the mail after your visit with us. Thank you!             Your Updated Medication List - Protect others around you: Learn how to safely use, store and throw away your medicines at www.disposemymeds.org.          This list is accurate as of: 1/4/18 11:49 AM.  Always use your most recent med list.                   Brand Name Dispense Instructions for use Diagnosis    amLODIPine 10 MG tablet    NORVASC     TAKE ONE TABLET BY MOUTH DAILY    Extrapyramidal disorder, Memory loss       B-12 1000 MCG Tbcr     100 tablet    Take 1,000 mcg by mouth daily    Vitamin B12 deficiency (non anemic)        carbidopa-levodopa  MG per tablet    SINEMET    120 tablet    Take 1 tablet by mouth 4 times daily    Parkinsonism, unspecified Parkinsonism type (H)       hydrochlorothiazide 25 MG tablet    HYDRODIURIL     Take 25 mg by mouth    Extrapyramidal disorder, Memory loss       polyethylene glycol Packet    MIRALAX/GLYCOLAX     Take 17 g by mouth    Extrapyramidal disorder, Memory loss

## 2018-01-04 NOTE — PATIENT INSTRUCTIONS
AFTER VISIT SUMMARY (AVS):    At today's visit we thoroughly discussed your current symptoms, treatment options, and the plan.    The following medications were changed: Carbidopa-levodopa (SINEMET)  MG: Take 1 tablet by mouth 4 times daily.  Side effects were discussed.  Please contact my clinic if you have any of intolerable side effects.    Next follow-up appointment is in the next 4 weeks or earlier if needed.    Please do not hesitate to call me with any questions or concerns.    Thanks.

## 2018-01-04 NOTE — PROGRESS NOTES
"ESTABLISHED PATIENT NEUROLOGY NOTE    DATE OF VISIT: 1/4/2018  CLINIC LOCATION: [facility]  MRN: [id number]  PATIENT NAME: Sánchez Castellanos  YOB: 1940    PCP: Telly Gold DO    REASON FOR VISIT:   Chief Complaint   Patient presents with     Follow Up For     Parkinsonism     SUBJECTIVE:                                                      HISTORY OF PRESENT ILLNESS: Patient is here for follow up regarding parkinsonism.  He was last seen on 10/05/2017.  At that time, he was started on Sinemet.  Please refer to my initial/other prior notes for further information.  The patient is accompanied by his wife today.    Since the last visit, the patient reports that he did not notice significant change in his symptoms after starting Sinemet.  He denies any significant side effects.  Ran out of Sinemet today, and did not refill it thinking that his dose might change after he sees me.  He continues to experience gait difficulty and bradykinesia.  No hand tremor.  The patient denies any interval development of new focal neurological symptoms.    Patient's wife reports that she noticed a difference, but \"not a dramatic change\", with Sinemet.  The patient is more active and does more chores around the house.  It looks like it is easier to move for him on the medication.    On review of systems, patient endorses no other acute complaints. Medications, allergies, family and social history were also reviewed. There are no changes reported by patient.    CURRENT MEDICATIONS:   Current Outpatient Prescriptions on File Prior to Visit:  amLODIPine (NORVASC) 10 MG tablet TAKE ONE TABLET BY MOUTH DAILY   hydrochlorothiazide (HYDRODIURIL) 25 MG tablet Take 25 mg by mouth   polyethylene glycol (MIRALAX/GLYCOLAX) Packet Take 17 g by mouth   [DISCONTINUED] carbidopa-levodopa (SINEMET)  MG per tablet Take 1 tablet by mouth 3 times daily   Cyanocobalamin (B-12) 1000 MCG TBCR Patient not taking: " Reported on 1/4/2018.     REVIEW OF SYSTEMS:                                                    10-system review was completed. Pertinent positives are included in HPI. The remainder of ROS is negative.  EXAM:                                                    Physical Exam:   Vitals: /68 (BP Location: Right arm, Patient Position: Sitting, Cuff Size: Adult Large)  Pulse 80  Temp 98.4  F (36.9  C) (Oral)  Resp 18  Wt 88.7 kg (195 lb 9.6 oz)  SpO2 98%    General: pt is in NAD, cooperative.  Skin: normal turgor, moist mucous membranes, no lesions/rashes noticed.  HEENT: ATNC, white sclera, normal conjunctiva.  Respiratory: Symmetric lung excursion, no accessory respiratory muscle use.  Abdomen: Non distended.  Neurological: awake, cooperative, follows commands, no aphasia or dysarthria noted, cranial nerves II-XII: no ptosis, extraocular motility is full, face is symmetric, hearing is bilaterally reduced, mild hypophonia and hypomimia, tongue is midline, equally moves all extremities, tone is increased in left upper extremity, no dysmetria bilaterally, stooped posture, makes shorter steps with reduced arm swings while walking, takes 3-4 steps to turn.    DATA:     Labs: I personally reviewed the following labs:  Orders Only on 11/30/2017   Component Date Value Ref Range Status     Vitamin B12 11/30/2017 399  193 - 986 pg/mL Final   Imaging: No new pertinent imaging.  ASSESSMENT and PLAN:                                                    Assessment: 77-year-old male patient presents for follow-up of his extrapyramidal disorder, likely consistent with Parkinson's disease.  The patient did not notice significant improvement on Sinemet, though his wife mentions that he moves more freely and is more active.  I think we need to further increase his Sinemet dose gradually.  We discussed that the doses around 2 tablets 4 times daily are sometimes needed to achieve noticeable benefit.  Once his Sinemet dose is  "adjusted, we will also discuss \"[facility] and Loud\" program (starting at the next visit).  The rest of our discussion and the plan are detailed below.    Diagnoses:    ICD-10-CM    1. Parkinsonism, unspecified Parkinsonism type (H) G20 carbidopa-levodopa (SINEMET)  MG per tablet     Plan: At today's visit we thoroughly discussed patient's current symptoms, treatment options, and the plan.    The following medications were changed: Carbidopa-levodopa (SINEMET)  M tablet by mouth 4 times daily.  Side effects were discussed.  I advised the patient to contact my clinic if he develops any of intolerable side effects.    Next follow-up appointment is in the next 4 weeks or earlier if needed.    I encouraged the patient to call me with any questions or concerns.    Total Time: 28 minutes with > 50% spent counseling the patient and his wife on stated above assessment and recommendations, including review of current symptoms, proposed plan of treatment, and prognosis.  Additional time was used to answer their questions.    Andrés Panda MD  / Neurology    "

## 2018-02-01 ENCOUNTER — OFFICE VISIT (OUTPATIENT)
Dept: NEUROLOGY | Facility: CLINIC | Age: 78
End: 2018-02-01
Payer: COMMERCIAL

## 2018-02-01 ENCOUNTER — RECORDS - HEALTHEAST (OUTPATIENT)
Dept: ADMINISTRATIVE | Facility: OTHER | Age: 78
End: 2018-02-01

## 2018-02-01 VITALS
WEIGHT: 194 LBS | DIASTOLIC BLOOD PRESSURE: 76 MMHG | RESPIRATION RATE: 16 BRPM | SYSTOLIC BLOOD PRESSURE: 126 MMHG | HEART RATE: 75 BPM | TEMPERATURE: 97.4 F | OXYGEN SATURATION: 98 %

## 2018-02-01 DIAGNOSIS — G20.C PARKINSONISM, UNSPECIFIED PARKINSONISM TYPE (H): ICD-10-CM

## 2018-02-01 DIAGNOSIS — G20.A1 PARKINSON'S DISEASE (H): Primary | ICD-10-CM

## 2018-02-01 PROCEDURE — 99214 OFFICE O/P EST MOD 30 MIN: CPT | Performed by: PSYCHIATRY & NEUROLOGY

## 2018-02-01 RX ORDER — CARBIDOPA AND LEVODOPA 25; 100 MG/1; MG/1
TABLET ORAL
Qty: 180 TABLET | Refills: 3 | Status: SHIPPED | OUTPATIENT
Start: 2018-02-01 | End: 2018-05-04

## 2018-02-01 NOTE — MR AVS SNAPSHOT
After Visit Summary   2/1/2018    Sánchez Castellanos    MRN: [id number]           Patient Information     Date Of Birth          1940        Visit Information        Provider Department      2/1/2018 1:00 PM Andrés Panda MD [facility]        Today's Diagnoses     Parkinson's disease (H)    -  1    Parkinsonism, unspecified Parkinsonism type (H)          Care Instructions    AFTER VISIT SUMMARY (AVS):    At today's visit we thoroughly discussed current symptoms, most likely diagnosis, treatment options and the plan, which includes:  Orders Placed This Encounter   Procedures     PHYSICAL THERAPY REFERRAL     The following medications were changed: Carbidopa-levodopa (SINEMET)  MG:   Week 1: Take 2 tablets at 8 AM, take 1 tablet at 12 PM, 4 PM, and 8 PM.  Week 2: Take 2 tablets at 8 AM and 4 PM, take 1 tablet at 12 PM and 8 PM.    Next follow-up appointment is in the next 3 months or earlier if needed.    Please do not hesitate to call me with any questions or concerns.    Thanks.            Follow-ups after your visit        Additional Services     PHYSICAL THERAPY REFERRAL       *This therapy referral will be filtered to a centralized scheduling office at [facility] and the patient will receive a call to schedule an appointment at a [place] location most convenient for them. *     [facility] provides Physical Therapy evaluation and treatment and many specialty services across the [place] system.  If requesting a specialty program, please choose from the list below.    If you have not heard from the scheduling office within 2 business days, please call [phone] for all locations, with the exception of [place], please call [phone].  Treatment: Evaluation & Treatment  Special Instructions/Modalities: Parkinson's disease.  Special Programs: Aquatic Therapy ([place], [place] and [place] locations  "only)  Parkinsons Rehabilitation - [facility].    Please be aware that coverage of these services is subject to the terms and limitations of your health insurance plan.  Call member services at your health plan with any benefit or coverage questions.      **Note to Provider:  If you are referring outside of [place] for the therapy appointment, please list the name of the location in the \"special instructions\" above, print the referral and give to the patient to schedule the appointment.                  Follow-up notes from your care team     Return in about 3 months (around 5/1/2018).      Your next 10 appointments already scheduled     Feb 01, 2018  1:00 PM CST   Return Visit with Andrés Panda MD   [facility]    [address] [place] [address] [phone]              Who to contact     If you have questions or need follow up information about today's clinic visit or your schedule please contact [facility] directly at [phone].  Normal or non-critical lab and imaging results will be communicated to you by MyChart, letter or phone within 4 business days after the clinic has received the results. If you do not hear from us within 7 days, please contact the clinic through [email or web address] or phone. If you have a critical or abnormal lab result, we will notify you by phone as soon as possible.  Submit refill requests through [employer] or call your pharmacy and they will forward the refill request to us. Please allow 3 business days for your refill to be completed.          Additional Information About Your Visit        [employer] Information     [employer] lets you send messages to your doctor, view your test results, renew your prescriptions, schedule appointments and more. To sign up, go to [email or web address] . Click on \"Log in\" on the left side of the screen, which will take you to the Welcome page. Then click on " "\"Sign up Now\" on the right side of the page.     You will be asked to enter the access code listed below, as well as some personal information. Please follow the directions to create your username and password.     Your access code is: [email or web address]  Expires: 2018 11:48 AM     Your access code will  in 90 days. If you need help or a new code, please call your [place] clinic or [phone].        Care EveryWhere ID     This is your Care EveryWhere ID. This could be used by other organizations to access your [place] medical records  [id number]        Your Vitals Were     Pulse Temperature Respirations Pulse Oximetry          75 97.4  F (36.3  C) (Oral) 16 98%         Blood Pressure from Last 3 Encounters:   18 126/76   18 128/68   10/05/17 129/81    Weight from Last 3 Encounters:   18 88 kg (194 lb)   18 88.7 kg (195 lb 9.6 oz)   10/05/17 86.5 kg (190 lb 9.6 oz)              We Performed the Following     PHYSICAL THERAPY REFERRAL          Today's Medication Changes          These changes are accurate as of 18 12:57 PM.  If you have any questions, ask your nurse or doctor.               These medicines have changed or have updated prescriptions.        Dose/Directions    carbidopa-levodopa  MG per tablet   Commonly known as:  SINEMET   This may have changed:    - how much to take  - how to take this  - when to take this  - additional instructions   Used for:  Parkinsonism, unspecified Parkinsonism type (H)   Changed by:  Andrés Panda MD        Week 1: Take 2 tablets at 8 AM, take 1 tablet at 12 PM, 4 PM, and 8 PM. Week 2: Take 2 tablets at 8 AM and 4 PM, take 1 tablet at 12 PM and 8 PM.   Quantity:  180 tablet   Refills:  3            Where to get your medicines      Some of these will need a paper prescription and others can be bought over the counter.  Ask your nurse if you have questions.     Bring a paper prescription for each of these " medications     carbidopa-levodopa  MG per tablet                Primary Care Provider Office Phone # Fax #    Telly Gold [phone]       [facility] [address] [place] [address]        Equal Access to Services     ZULMA ANGELO : Hadii kade ku hadabdulazizo Soomaali, waaxda luqadaha, qaybta kaalmada adeegyada, waxkevan idiin haykrystaln adeedna waddell laYajairawily syed. So [facility] [phone].    ATENCIÓN: Si habla español, tiene a calzada disposición servicios gratuitos de asistencia lingüística. Llame al [phone].    We comply with applicable federal civil rights laws and [place] laws. We do not discriminate on the basis of race, color, national origin, age, disability, sex, sexual orientation, or gender identity.            Thank you!     Thank you for choosing [facility]  for your care. Our goal is always to provide you with excellent care. Hearing back from our patients is one way we can continue to improve our services. Please take a few minutes to complete the written survey that you may receive in the mail after your visit with us. Thank you!             Your Updated Medication List - Protect others around you: Learn how to safely use, store and throw away your medicines at www.disposemymeds.org.          This list is accurate as of 2/1/18 12:57 PM.  Always use your most recent med list.                   Brand Name Dispense Instructions for use Diagnosis    amLODIPine 10 MG tablet    NORVASC     TAKE ONE TABLET BY MOUTH DAILY    Extrapyramidal disorder, Memory loss       B-12 1000 MCG Tbcr     100 tablet    Take 1,000 mcg by mouth daily    Vitamin B12 deficiency (non anemic)       carbidopa-levodopa  MG per tablet    SINEMET    180 tablet    Week 1: Take 2 tablets at 8 AM, take 1 tablet at 12 PM, 4 PM, and 8 PM. Week 2: Take 2 tablets at 8 AM and 4 PM, take 1 tablet at 12 PM and 8 PM.    Parkinsonism, unspecified Parkinsonism type (H)        hydrochlorothiazide 25 MG tablet    HYDRODIURIL     Take 25 mg by mouth    Extrapyramidal disorder, Memory loss       polyethylene glycol Packet    MIRALAX/GLYCOLAX     Take 17 g by mouth    Extrapyramidal disorder, Memory loss

## 2018-02-01 NOTE — LETTER
"    2/1/2018         RE: Sánchez Castellanos  [address] [facility] [address]        Dear Colleague,    Thank you for referring your patient, Sánchez Castellanos, to the [facility]. Please see a copy of my visit note below.    ESTABLISHED PATIENT NEUROLOGY NOTE    DATE OF VISIT: 2/1/2018  CLINIC LOCATION: [facility]  MRN: [id number]  PATIENT NAME: Sánchez Castellanos  YOB: 1940    PCP: Telly Gold DO.    REASON FOR VISIT:   Chief Complaint   Patient presents with     Follow Up For     parkinsonism     SUBJECTIVE:                                                      HISTORY OF PRESENT ILLNESS: Patient is here for follow up regarding parkinsonism.  He was last seen on 01/04/2018.  At that time, his Sinemet dose was increased.  Please refer to my initial/other prior notes for further information.  The patient is accompanied by his wife, who participates in interview.    The patient increased his Sinemet dose to 4 times daily, as recommended.  He reports that he is \"staying the same without much improvement\".  His wife agrees with the patient.  The patient denies any new focal neurological symptoms.  He wants to be stronger.    On review of systems, patient endorses no other new complaints. Medications, allergies, family and social history were also reviewed. There are no changes reported by patient.    CURRENT MEDICATIONS:   Current Outpatient Prescriptions   Medication     carbidopa-levodopa (SINEMET)  MG per tablet     amLODIPine (NORVASC) 10 MG tablet     hydrochlorothiazide (HYDRODIURIL) 25 MG tablet     polyethylene glycol (MIRALAX/GLYCOLAX) Packet     Cyanocobalamin (B-12) 1000 MCG TBCR     REVIEW OF SYSTEMS:                                                    10-system review was completed. Pertinent positives are included in HPI. The remainder of ROS is negative.  EXAM:                                                    Physical Exam:   Vitals: /76 (BP " "Location: Right arm, Patient Position: Sitting, Cuff Size: Adult Large)  Pulse 75  Temp 97.4  F (36.3  C) (Oral)  Resp 16  Wt 88 kg (194 lb)  SpO2 98%    General: pt is in NAD, cooperative.  Skin: normal turgor, moist mucous membranes, no lesions/rashes noticed.  HEENT: ATNC, white sclera, normal conjunctiva.  Respiratory: Symmetric lung excursion, no accessory respiratory muscle use.  Abdomen: Non distended.  Neurological: awake, cooperative, follows commands, no aphasia or dysarthria noted, cranial nerves II-XII: no ptosis, extraocular motility is full, face is symmetric, hearing is bilaterally reduced, hypomimia and hypophonia slightly improved, tongue is midline, equally moves all extremities, no dysmetria bilaterally, stooped forward posture, shuffling gait, though improved from the last time.    DATA:   Labs/Imaging: No new pertinent information.  ASSESSMENT and PLAN:                                                    Assessment: 77-year-old male patient presents for follow-up of his presumed Parkinson's disease.  His neurological exam somewhat improved, even though he does not appreciate much improvement with mild increase of his Sinemet dose.  We will further increase it to 6 tablets per day.  In addition, I also made the referral to physical therapy for \"[facility]\" program.  The rest of the plan is detailed below.    Diagnoses:    ICD-10-CM    1. Parkinson's disease (H) G20 PHYSICAL THERAPY REFERRAL   2. Parkinsonism, unspecified Parkinsonism type (H) G20 carbidopa-levodopa (SINEMET)  MG per tablet     Plan: At today's visit we thoroughly discussed current symptoms, most likely diagnosis, treatment options and the plan, which includes:  Orders Placed This Encounter   Procedures     PHYSICAL THERAPY REFERRAL     The following medications were changed: Carbidopa-levodopa (SINEMET)  MG:   Week 1: Take 2 tablets at 8 AM, take 1 tablet at 12 PM, 4 PM, and 8 PM.  Week 2: Take 2 tablets at 8 AM " and 4 PM, take 1 tablet at 12 PM and 8 PM.    Next follow-up appointment is in the next 3 months or earlier if needed.    I advised the patient to call me with any questions or concerns.    Total Time: 27 minutes with > 50% spent counseling the patient and his wife on stated above assessment and recommendations, including nature of the diagnosis, proposed plan of treatment, and prognosis.  Additional time was used to answer their questions.    Andrés Panda MD  / Neurology        Again, thank you for allowing me to participate in the care of your patient.        Sincerely,        Andrés Panda MD

## 2018-02-01 NOTE — PROGRESS NOTES
"ESTABLISHED PATIENT NEUROLOGY NOTE    DATE OF VISIT: 2/1/2018  CLINIC LOCATION: [facility]  MRN: [id number]  PATIENT NAME: Sánchez Castellanos  YOB: 1940    PCP: Telly Gold DO.    REASON FOR VISIT:   Chief Complaint   Patient presents with     Follow Up For     parkinsonism     SUBJECTIVE:                                                      HISTORY OF PRESENT ILLNESS: Patient is here for follow up regarding parkinsonism.  He was last seen on 01/04/2018.  At that time, his Sinemet dose was increased.  Please refer to my initial/other prior notes for further information.  The patient is accompanied by his wife, who participates in interview.    The patient increased his Sinemet dose to 4 times daily, as recommended.  He reports that he is \"staying the same without much improvement\".  His wife agrees with the patient.  The patient denies any new focal neurological symptoms.  He wants to be stronger.    On review of systems, patient endorses no other new complaints. Medications, allergies, family and social history were also reviewed. There are no changes reported by patient.    CURRENT MEDICATIONS:   Current Outpatient Prescriptions   Medication     carbidopa-levodopa (SINEMET)  MG per tablet     amLODIPine (NORVASC) 10 MG tablet     hydrochlorothiazide (HYDRODIURIL) 25 MG tablet     polyethylene glycol (MIRALAX/GLYCOLAX) Packet     Cyanocobalamin (B-12) 1000 MCG TBCR     REVIEW OF SYSTEMS:                                                    10-system review was completed. Pertinent positives are included in HPI. The remainder of ROS is negative.  EXAM:                                                    Physical Exam:   Vitals: /76 (BP Location: Right arm, Patient Position: Sitting, Cuff Size: Adult Large)  Pulse 75  Temp 97.4  F (36.3  C) (Oral)  Resp 16  Wt 88 kg (194 lb)  SpO2 98%    General: pt is in NAD, cooperative.  Skin: normal turgor, moist mucous membranes, " "no lesions/rashes noticed.  HEENT: ATNC, white sclera, normal conjunctiva.  Respiratory: Symmetric lung excursion, no accessory respiratory muscle use.  Abdomen: Non distended.  Neurological: awake, cooperative, follows commands, no aphasia or dysarthria noted, cranial nerves II-XII: no ptosis, extraocular motility is full, face is symmetric, hearing is bilaterally reduced, hypomimia and hypophonia slightly improved, tongue is midline, equally moves all extremities, no dysmetria bilaterally, stooped forward posture, shuffling gait, though improved from the last time.    DATA:   Labs/Imaging: No new pertinent information.  ASSESSMENT and PLAN:                                                    Assessment: 77-year-old male patient presents for follow-up of his presumed Parkinson's disease.  His neurological exam somewhat improved, even though he does not appreciate much improvement with mild increase of his Sinemet dose.  We will further increase it to 6 tablets per day.  In addition, I also made the referral to physical therapy for \"[facility]\" program.  The rest of the plan is detailed below.    Diagnoses:    ICD-10-CM    1. Parkinson's disease (H) G20 PHYSICAL THERAPY REFERRAL   2. Parkinsonism, unspecified Parkinsonism type (H) G20 carbidopa-levodopa (SINEMET)  MG per tablet     Plan: At today's visit we thoroughly discussed current symptoms, most likely diagnosis, treatment options and the plan, which includes:  Orders Placed This Encounter   Procedures     PHYSICAL THERAPY REFERRAL     The following medications were changed: Carbidopa-levodopa (SINEMET)  MG:   Week 1: Take 2 tablets at 8 AM, take 1 tablet at 12 PM, 4 PM, and 8 PM.  Week 2: Take 2 tablets at 8 AM and 4 PM, take 1 tablet at 12 PM and 8 PM.    Next follow-up appointment is in the next 3 months or earlier if needed.    I advised the patient to call me with any questions or concerns.    Total Time: 27 minutes with > 50% spent counseling " the patient and his wife on stated above assessment and recommendations, including nature of the diagnosis, proposed plan of treatment, and prognosis.  Additional time was used to answer their questions.    Andrés Panda MD  HP/ Neurology

## 2018-02-01 NOTE — PATIENT INSTRUCTIONS
AFTER VISIT SUMMARY (AVS):    At today's visit we thoroughly discussed current symptoms, most likely diagnosis, treatment options and the plan, which includes:  Orders Placed This Encounter   Procedures     PHYSICAL THERAPY REFERRAL     The following medications were changed: Carbidopa-levodopa (SINEMET)  MG:   Week 1: Take 2 tablets at 8 AM, take 1 tablet at 12 PM, 4 PM, and 8 PM.  Week 2: Take 2 tablets at 8 AM and 4 PM, take 1 tablet at 12 PM and 8 PM.    Next follow-up appointment is in the next 3 months or earlier if needed.    Please do not hesitate to call me with any questions or concerns.    Thanks.

## 2018-05-04 ENCOUNTER — OFFICE VISIT (OUTPATIENT)
Dept: NEUROLOGY | Facility: CLINIC | Age: 78
End: 2018-05-04
Payer: COMMERCIAL

## 2018-05-04 ENCOUNTER — RECORDS - HEALTHEAST (OUTPATIENT)
Dept: ADMINISTRATIVE | Facility: OTHER | Age: 78
End: 2018-05-04

## 2018-05-04 VITALS
WEIGHT: 211 LBS | HEART RATE: 86 BPM | RESPIRATION RATE: 16 BRPM | DIASTOLIC BLOOD PRESSURE: 76 MMHG | TEMPERATURE: 98 F | HEIGHT: 77 IN | BODY MASS INDEX: 24.91 KG/M2 | SYSTOLIC BLOOD PRESSURE: 120 MMHG | OXYGEN SATURATION: 97 %

## 2018-05-04 DIAGNOSIS — G20.A1 PARKINSON'S DISEASE (H): Primary | ICD-10-CM

## 2018-05-04 PROCEDURE — 99214 OFFICE O/P EST MOD 30 MIN: CPT | Performed by: PSYCHIATRY & NEUROLOGY

## 2018-05-04 RX ORDER — CARBIDOPA AND LEVODOPA 25; 100 MG/1; MG/1
TABLET ORAL
Qty: 240 TABLET | Refills: 3 | Status: SHIPPED | OUTPATIENT
Start: 2018-05-04 | End: 2018-08-03

## 2018-05-04 NOTE — MR AVS SNAPSHOT
After Visit Summary   5/4/2018    Sánchez Castellanos    MRN: [id number]           Patient Information     Date Of Birth          1940        Visit Information        Provider Department      5/4/2018 1:00 PM Andrés Panda MD [facility]        Today's Diagnoses     Parkinson's disease (H)    -  1    Parkinsonism, unspecified Parkinsonism type (H)          Care Instructions    AFTER VISIT SUMMARY (AVS):    At today's visit we discussed current symptoms, available treatment options, and the plan.    The following medications were ordered: Carbidopa-levodopa (SINEMET)  MG:  Week 1: Take 2 tablets at 8 AM, 12 PM, 4 PM, and take 1 tablet at 8 PM.  Week 2: Take 2 tablets at 8 AM, 12 PM, 4 PM,  and 8 PM.    Next follow-up appointment is in the next 3 months or earlier if needed.    Please do not hesitate to call me with any questions or concerns.    Thanks.            Follow-ups after your visit        Follow-up notes from your care team     Return in about 3 months (around 8/4/2018).      Your next 10 appointments already scheduled     May 04, 2018  1:00 PM CDT   Return Visit with Andrés Panda MD   [facility]    [address] [place] [address] [phone]            Aug 03, 2018  1:00 PM CDT   Return Visit with Andrés Panda MD   [facility] ([address] [address] [phone]              Who to contact     If you have questions or need follow up information about today's clinic visit or your schedule please contact [facility] directly at [phone].  Normal or non-critical lab and imaging results will be communicated to you by MyChart, letter or phone within 4 business days after the clinic has received the results. If you do not hear from us within 7 days, please  "contact the clinic through [employer] or phone. If you have a critical or abnormal lab result, we will notify you by phone as soon as possible.  Submit refill requests through [employer] or call your pharmacy and they will forward the refill request to us. Please allow 3 business days for your refill to be completed.          Additional Information About Your Visit        [employer] Information     [employer] lets you send messages to your doctor, view your test results, renew your prescriptions, schedule appointments and more. To sign up, go to [email or web address] . Click on \"Log in\" on the left side of the screen, which will take you to the Welcome page. Then click on \"Sign up Now\" on the right side of the page.     You will be asked to enter the access code listed below, as well as some personal information. Please follow the directions to create your username and password.     Your access code is: [email or web address]  Expires: 2018 12:49 PM     Your access code will  in 90 days. If you need help or a new code, please call your [facility] clinic or [phone].        Care EveryWhere ID     This is your Care EveryWhere ID. This could be used by other organizations to access your [facility] medical records  [id number]        Your Vitals Were     Pulse Temperature Respirations Height Pulse Oximetry BMI (Body Mass Index)    86 98  F (36.7  C) (Oral) 16 6' 5\" (1.956 m) 97% 25.02 kg/m2       Blood Pressure from Last 3 Encounters:   18 120/76   18 126/76   18 128/68    Weight from Last 3 Encounters:   18 211 lb (95.7 kg)   18 194 lb (88 kg)   18 195 lb 9.6 oz (88.7 kg)              Today, you had the following     No orders found for display         Today's Medication Changes          These changes are accurate as of 18 12:49 PM.  If you have any questions, ask your nurse or doctor.               These medicines have changed or have updated prescriptions.        Dose/Directions    " carbidopa-levodopa  MG per tablet   Commonly known as:  SINEMET   This may have changed:  additional instructions   Used for:  Parkinsonism, unspecified Parkinsonism type (H)   Changed by:  Andrés Panda MD        Week 1: Take 2 tablets at 8 AM, 12 PM, 4 PM, and take 1 tablet at 8 PM. Week 2: Take 2 tablets at 8 AM, 12 PM, 4 PM,  and 8 PM.   Quantity:  240 tablet   Refills:  3            Where to get your medicines      These medications were sent to [facility] [id number] - [place], [place] - [address] [facility] [address]  [address], [place] [address]    Hours:  24-hours Phone:  [phone]     carbidopa-levodopa  MG per tablet                Primary Care Provider Office Phone # Fax #    Telly MARTINEZ Do Kevin,  [phone]       [facility] [address] [facility] [address]        Equal Access to Services     ZULMA ANGELO AH: Hadii aad ku hadasho Soomaali, waaxda luqadaha, qaybta kaalmada adeegyada, waxay idiin hayaan adeeg khtaiwo solorio . So [facility] [phone].    ATENCIÓN: Si habla español, tiene a calzada disposición servicios gratuitos de asistencia lingüística. Llame al [phone].    We comply with applicable federal civil rights laws and [place] laws. We do not discriminate on the basis of race, color, national origin, age, disability, sex, sexual orientation, or gender identity.            Thank you!     Thank you for choosing [facility]  for your care. Our goal is always to provide you with excellent care. Hearing back from our patients is one way we can continue to improve our services. Please take a few minutes to complete the written survey that you may receive in the mail after your visit with us. Thank you!             Your Updated Medication List - Protect others around you: Learn how to safely use, store and throw away your medicines at www.disposemymeds.org.          This list is accurate  as of 5/4/18 12:49 PM.  Always use your most recent med list.                   Brand Name Dispense Instructions for use Diagnosis    amLODIPine 10 MG tablet    NORVASC     TAKE ONE TABLET BY MOUTH DAILY    Extrapyramidal disorder, Memory loss       B-12 1000 MCG Tbcr     100 tablet    Take 1,000 mcg by mouth daily    Vitamin B12 deficiency (non anemic)       carbidopa-levodopa  MG per tablet    SINEMET    240 tablet    Week 1: Take 2 tablets at 8 AM, 12 PM, 4 PM, and take 1 tablet at 8 PM. Week 2: Take 2 tablets at 8 AM, 12 PM, 4 PM,  and 8 PM.    Parkinsonism, unspecified Parkinsonism type (H)       hydrochlorothiazide 25 MG tablet    HYDRODIURIL     Take 25 mg by mouth    Extrapyramidal disorder, Memory loss       polyethylene glycol Packet    MIRALAX/GLYCOLAX     Take 17 g by mouth    Extrapyramidal disorder, Memory loss

## 2018-05-04 NOTE — LETTER
"    5/4/2018         RE: Sánchez Castellanos  [address] [facility] [address]        Dear Colleague,    Thank you for referring your patient, Sánchez Castellanos, to the [facility]. Please see a copy of my visit note below.    ESTABLISHED PATIENT NEUROLOGY NOTE    DATE OF VISIT: 5/4/2018  CLINIC LOCATION: [facility]  MRN: [id number]  PATIENT NAME: Sánchez Castellanos  YOB: 1940    PCP: Telly Gold DO    REASON FOR VISIT:   Chief Complaint   Patient presents with     RECHECK     Parkinson's     SUBJECTIVE:                                                      HISTORY OF PRESENT ILLNESS: Patient is here for follow up regarding his Parkinson's disease.  Last visit was on 02/01/2018.  At that time his Sinemet dose was increased, and he was referred to physical therapy.  Please refer to my initial/other prior notes for further information.  The patient is accompanied by his wife, who participates in interview.    Since the last visit, the patient reports no significant difference in his symptoms.  He increased his Sinemet dose as advised.  He denies any noticeable side effects.  No new neurological symptoms.  He did not go to physical therapy and does not want to go in the future.  \"You probably encountered stubborn guys before\".    On review of systems, patient endorses no new additional complaints.  Medications, allergies, family and social history were also reviewed.  There are no changes reported by patient.    CURRENT MEDICATIONS:   Current Outpatient Prescriptions   Medication     amLODIPine (NORVASC) 10 MG tablet     hydrochlorothiazide (HYDRODIURIL) 25 MG tablet     polyethylene glycol (MIRALAX/GLYCOLAX) Packet     [DISCONTINUED] carbidopa-levodopa (SINEMET)  MG per tablet     carbidopa-levodopa (SINEMET)  MG per tablet     Cyanocobalamin (B-12) 1000 MCG TBCR     REVIEW OF SYSTEMS:                                                    10-system review was completed. " "Pertinent positives are included in HPI. The remainder of ROS is negative.  EXAM:                                                    Physical Exam:   Vitals: /76  Pulse 86  Temp 98  F (36.7  C) (Oral)  Resp 16  Ht 1.956 m (6' 5\")  Wt 95.7 kg (211 lb)  SpO2 97%  BMI 25.02 kg/m2    General: pt is in NAD, cooperative.  Skin: normal turgor, moist mucous membranes, no lesions/rashes noticed.  HEENT: ATNC, white sclera, normal conjunctiva.  Respiratory: Symmetric lung excursion, no accessory respiratory muscle use.  Abdomen: Non distended.  Neurological: awake, cooperative, follows commands, no aphasia or dysarthria noted, cranial nerves II-XII: no ptosis, extraocular motility is full, face is symmetric, mild hypomimia and hypophonia, hearing is bilaterally reduced, tongue is midline, equally moves all extremities, mild bradykinesia, no dysmetria bilaterally, stooped forward posture, shuffling gait with decreased arm swings.    ASSESSMENT and PLAN:                                                    Assessment: 77-year-old male patient with Parkinson's disease presents for follow-up.  He continues to exhibit extrapyramidal features on slightly increased dose of Sinemet.  I would like to further increase it to 2 tablets 4 times daily.  We reviewed the importance of regular physical exercise/activity and physical therapy (\"[employer]\" program) to improve his long-term mobility.  The patient is receptive of idea of regular physical activity, but refuses physical therapy at this point.    Diagnoses:    ICD-10-CM    1. Parkinson's disease (H) G20 carbidopa-levodopa (SINEMET)  MG per tablet     Plan: At today's visit we thoroughly discussed current symptoms, available treatment options, and the plan.    The following medications were changed:  Carbidopa-levodopa (SINEMET)  MG:  Week 1: 2 tablets at 8 AM, 12 PM, 4 PM, and take 1 tablet at 8 PM.  Week 2: 2 tablets at 8 AM, 12 PM, 4 PM,  and 8 PM.    Next " follow-up appointment is in the next 3 months or earlier if needed.    Advised the patient to call me with any questions or concerns.    Total Time: 26 minutes with > 50% spent counseling the patient on stated above assessment and recommendations, including nature of the diagnosis, available treatment options and, proposed plan of treatment.    Andrés Panda MD  / Neurology        Again, thank you for allowing me to participate in the care of your patient.        Sincerely,        Andrés Panda MD

## 2018-05-04 NOTE — PATIENT INSTRUCTIONS
AFTER VISIT SUMMARY (AVS):    At today's visit we discussed current symptoms, available treatment options, and the plan.    The following medications were ordered: Carbidopa-levodopa (SINEMET)  MG:  Week 1: Take 2 tablets at 8 AM, 12 PM, 4 PM, and take 1 tablet at 8 PM.  Week 2: Take 2 tablets at 8 AM, 12 PM, 4 PM,  and 8 PM.    Next follow-up appointment is in the next 3 months or earlier if needed.    Please do not hesitate to call me with any questions or concerns.    Thanks.

## 2018-05-04 NOTE — PROGRESS NOTES
"ESTABLISHED PATIENT NEUROLOGY NOTE    DATE OF VISIT: 5/4/2018  CLINIC LOCATION: [facility]  MRN: [id number]  PATIENT NAME: Sánchez Castellanos  YOB: 1940    PCP: Telly Gold DO    REASON FOR VISIT:   Chief Complaint   Patient presents with     RECHECK     Parkinson's     SUBJECTIVE:                                                      HISTORY OF PRESENT ILLNESS: Patient is here for follow up regarding his Parkinson's disease.  Last visit was on 02/01/2018.  At that time his Sinemet dose was increased, and he was referred to physical therapy.  Please refer to my initial/other prior notes for further information.  The patient is accompanied by his wife, who participates in interview.    Since the last visit, the patient reports no significant difference in his symptoms.  He increased his Sinemet dose as advised.  He denies any noticeable side effects.  No new neurological symptoms.  He did not go to physical therapy and does not want to go in the future.  \"You probably encountered stubborn guys before\".    On review of systems, patient endorses no new additional complaints.  Medications, allergies, family and social history were also reviewed.  There are no changes reported by patient.    CURRENT MEDICATIONS:   Current Outpatient Prescriptions   Medication     amLODIPine (NORVASC) 10 MG tablet     hydrochlorothiazide (HYDRODIURIL) 25 MG tablet     polyethylene glycol (MIRALAX/GLYCOLAX) Packet     [DISCONTINUED] carbidopa-levodopa (SINEMET)  MG per tablet     carbidopa-levodopa (SINEMET)  MG per tablet     Cyanocobalamin (B-12) 1000 MCG TBCR     REVIEW OF SYSTEMS:                                                    10-system review was completed. Pertinent positives are included in HPI. The remainder of ROS is negative.  EXAM:                                                    Physical Exam:   Vitals: /76  Pulse 86  Temp 98  F (36.7  C) (Oral)  Resp 16  Ht 1.956 m " "(6' 5\")  Wt 95.7 kg (211 lb)  SpO2 97%  BMI 25.02 kg/m2    General: pt is in NAD, cooperative.  Skin: normal turgor, moist mucous membranes, no lesions/rashes noticed.  HEENT: ATNC, white sclera, normal conjunctiva.  Respiratory: Symmetric lung excursion, no accessory respiratory muscle use.  Abdomen: Non distended.  Neurological: awake, cooperative, follows commands, no aphasia or dysarthria noted, cranial nerves II-XII: no ptosis, extraocular motility is full, face is symmetric, mild hypomimia and hypophonia, hearing is bilaterally reduced, tongue is midline, equally moves all extremities, mild bradykinesia, no dysmetria bilaterally, stooped forward posture, shuffling gait with decreased arm swings.    ASSESSMENT and PLAN:                                                    Assessment: 77-year-old male patient with Parkinson's disease presents for follow-up.  He continues to exhibit extrapyramidal features on slightly increased dose of Sinemet.  I would like to further increase it to 2 tablets 4 times daily.  We reviewed the importance of regular physical exercise/activity and physical therapy (\"[employer]\" program) to improve his long-term mobility.  The patient is receptive of idea of regular physical activity, but refuses physical therapy at this point.    Diagnoses:    ICD-10-CM    1. Parkinson's disease (H) G20 carbidopa-levodopa (SINEMET)  MG per tablet     Plan: At today's visit we thoroughly discussed current symptoms, available treatment options, and the plan.    The following medications were changed:  Carbidopa-levodopa (SINEMET)  MG:  Week 1: 2 tablets at 8 AM, 12 PM, 4 PM, and take 1 tablet at 8 PM.  Week 2: 2 tablets at 8 AM, 12 PM, 4 PM,  and 8 PM.    Next follow-up appointment is in the next 3 months or earlier if needed.    Advised the patient to call me with any questions or concerns.    Total Time: 26 minutes with > 50% spent counseling the patient on stated above assessment and " recommendations, including nature of the diagnosis, available treatment options and, proposed plan of treatment.    Andrés Panda MD  HP/ Neurology

## 2018-05-09 ENCOUNTER — COMMUNICATION - HEALTHEAST (OUTPATIENT)
Dept: INTERNAL MEDICINE | Facility: CLINIC | Age: 78
End: 2018-05-09

## 2018-05-09 DIAGNOSIS — I10 ESSENTIAL HYPERTENSION: ICD-10-CM

## 2018-06-08 ENCOUNTER — COMMUNICATION - HEALTHEAST (OUTPATIENT)
Dept: INTERNAL MEDICINE | Facility: CLINIC | Age: 78
End: 2018-06-08

## 2018-06-08 DIAGNOSIS — I10 ESSENTIAL HYPERTENSION: ICD-10-CM

## 2018-06-14 ENCOUNTER — OFFICE VISIT - HEALTHEAST (OUTPATIENT)
Dept: INTERNAL MEDICINE | Facility: CLINIC | Age: 78
End: 2018-06-14

## 2018-06-14 DIAGNOSIS — I10 ESSENTIAL HYPERTENSION: ICD-10-CM

## 2018-06-14 DIAGNOSIS — R91.1 LUNG NODULE: ICD-10-CM

## 2018-06-14 DIAGNOSIS — G20.A1 PARKINSON DISEASE (H): ICD-10-CM

## 2018-06-14 ASSESSMENT — MIFFLIN-ST. JEOR: SCORE: 1796.72

## 2018-06-25 ENCOUNTER — COMMUNICATION - HEALTHEAST (OUTPATIENT)
Dept: INTERNAL MEDICINE | Facility: CLINIC | Age: 78
End: 2018-06-25

## 2018-06-25 DIAGNOSIS — I10 HYPERTENSION: ICD-10-CM

## 2018-08-03 ENCOUNTER — OFFICE VISIT (OUTPATIENT)
Dept: NEUROLOGY | Facility: CLINIC | Age: 78
End: 2018-08-03
Payer: COMMERCIAL

## 2018-08-03 ENCOUNTER — RECORDS - HEALTHEAST (OUTPATIENT)
Dept: ADMINISTRATIVE | Facility: OTHER | Age: 78
End: 2018-08-03

## 2018-08-03 VITALS
BODY MASS INDEX: 24.19 KG/M2 | RESPIRATION RATE: 16 BRPM | SYSTOLIC BLOOD PRESSURE: 119 MMHG | HEART RATE: 70 BPM | WEIGHT: 204 LBS | DIASTOLIC BLOOD PRESSURE: 77 MMHG | TEMPERATURE: 97.7 F | OXYGEN SATURATION: 97 %

## 2018-08-03 DIAGNOSIS — G20.A1 PARKINSON'S DISEASE (H): Primary | ICD-10-CM

## 2018-08-03 PROCEDURE — 99214 OFFICE O/P EST MOD 30 MIN: CPT | Performed by: PSYCHIATRY & NEUROLOGY

## 2018-08-03 RX ORDER — CARBIDOPA AND LEVODOPA 25; 100 MG/1; MG/1
2 TABLET ORAL 4 TIMES DAILY
Qty: 720 TABLET | Refills: 1 | Status: SHIPPED | OUTPATIENT
Start: 2018-08-03

## 2018-08-03 NOTE — LETTER
8/3/2018         RE: Sánchez Castellanos  [address] [facility] [address]        Dear Colleague,    Thank you for referring your patient, Sánchez Castellanos, to the [facility]. Please see a copy of my visit note below.    ESTABLISHED PATIENT NEUROLOGY NOTE    DATE OF VISIT: 8/3/2018  CLINIC LOCATION: [facility]  MRN: [id number]  PATIENT NAME: Sánchez Castellanos  YOB: 1940    PCP: Telly Gold DO    REASON FOR VISIT:   Chief Complaint   Patient presents with     Parkinson     sx are the same and want to discuss if Sinemet medication might be causing his constipation     SUBJECTIVE:                                                      HISTORY OF PRESENT ILLNESS: Patient is here for follow up regarding his Parkinson's disease.  He was last seen on 05/04/2018.  At that time, his Sinemet dose was further increased to 2 tablets 4 times daily.  Please refer to my initial/other prior notes for further information.  The patient is accompanied by his wife, who participates in interview.    Since the last visit, the patient reports no significant interval changes of his symptoms.  He increased Sinemet dose as recommended without significant side effects, except constipation.  He had a fall while mowing his lawn at the end of May.  No other falls since then.    Patient's wife feels that the medication is very helpful for patient to move better.  No new neurological symptoms.    On review of systems, patient endorses no additional active complaints. Medications, allergies, family and social history were also reviewed. There are no changes reported by patient.  REVIEW OF SYSTEMS:                                                    10-system review was completed. Pertinent positives are included in HPI. The remainder of ROS is negative.  EXAM:                                                    Physical Exam:   Vitals: /77 (BP Location: Left arm, Patient Position: Sitting, Cuff  Size: Adult Large)  Pulse 70  Temp 97.7  F (36.5  C) (Oral)  Resp 16  Wt 92.5 kg (204 lb)  SpO2 97%  BMI 24.19 kg/m2    General: pt is in NAD, cooperative.  Skin: normal turgor, moist mucous membranes, no lesions/rashes noticed.  HEENT: ATNC, white sclera, normal conjunctiva.  Respiratory: Symmetric lung excursion, no accessory respiratory muscle use.  Abdomen: Non distended.  Neurological: awake, cooperative, follows commands, no aphasia or dysarthria noted, cranial nerves II-XII: mild hypomimia and hypophonia, hearing is reduced bilaterally, equally moves all extremities, mild bradykinesia, stooped forward posture, shuffling gait with diminished arm swings.  ASSESSMENT and PLAN:                                                    Assessment: 77-year-old male patient with Parkinson's disease presents for follow-up.  He is on 25/100 mg Sinemet 2 tablets 4 times daily without noticeable side effects, except constipation.  We discussed with the patient that it could be side effect of Sinemet versus a symptom of Parkinson's disease itself.  For that reason, I advised the patient to discuss treatment of constipation with his primary care provider first before we make any Sinemet reduction as it does affect his ability to move.  Otherwise, he is clinically stable on the current regimen.  We reviewed other options (DA and COMT inhibitors), but no medication changes are needed at this point.    Diagnoses:    ICD-10-CM    1. Parkinson's disease (H) G20 carbidopa-levodopa (SINEMET)  MG per tablet     Plan:At today's visit we thoroughly discussed the current symptoms, treatment options, and the plan.  We will not make any medication changes at this time.    I advised the patient to discuss the treatment of constipation with his primary care provider.    Next follow-up appointment is in the next 6 months or earlier if needed.    I encouraged the patient to call me with any questions or concerns.    Total Time: 28  minutes with > 50% spent counseling the patient on stated above assessment and recommendations, including review of current symptoms and the plan of treatment.    Andrés Panda MD  / Neurology         Again, thank you for allowing me to participate in the care of your patient.        Sincerely,        Andrés Panda MD

## 2018-08-03 NOTE — MR AVS SNAPSHOT
After Visit Summary   8/3/2018    Sánchez Castellanos    MRN: [id number]           Patient Information     Date Of Birth          1940        Visit Information        Provider Department      8/3/2018 1:00 PM Andrés Panda MD [facility]        Today's Diagnoses     Parkinson's disease (H)    -  1      Care Instructions    AFTER VISIT SUMMARY (AVS):    At today's visit we thoroughly discussed the current symptoms, treatment options, and the plan.  We will not make any medication changes at this time.    Please discuss the treatment of constipation with your primary care provider.    Next follow-up appointment is in the next 6 months or earlier if needed.    Please do not hesitate to call me with any questions or concerns.    Thanks.           Follow-ups after your visit        Follow-up notes from your care team     Return in about 6 months (around 2/3/2019).      Your next 10 appointments already scheduled     Jan 03, 2019  1:00 PM CST   Return Visit with Andrés Panda MD   [facility] ([facility])    [address] [place] [id number] [phone]              Who to contact     If you have questions or need follow up information about today's clinic visit or your schedule please contact [facility] directly at [phone].  Normal or non-critical lab and imaging results will be communicated to you by MyChart, letter or phone within 4 business days after the clinic has received the results. If you do not hear from us within 7 days, please contact the clinic through MyChart or phone. If you have a critical or abnormal lab result, we will notify you by phone as soon as possible.  Submit refill requests through [employer] or call your pharmacy and they will forward the refill request to us. Please allow 3 business days for your refill to be completed.          Additional Information  About Your Visit        Care EveryWhere ID     This is your Care EveryWhere ID. This could be used by other organizations to access your [place] medical records  [id number]        Your Vitals Were     Pulse Temperature Respirations Pulse Oximetry BMI (Body Mass Index)       70 97.7  F (36.5  C) (Oral) 16 97% 24.19 kg/m2        Blood Pressure from Last 3 Encounters:   08/03/18 119/77   05/04/18 120/76   02/01/18 126/76    Weight from Last 3 Encounters:   08/03/18 204 lb (92.5 kg)   05/04/18 211 lb (95.7 kg)   02/01/18 194 lb (88 kg)              Today, you had the following     No orders found for display         Today's Medication Changes          These changes are accurate as of 8/3/18  3:40 PM.  If you have any questions, ask your nurse or doctor.               These medicines have changed or have updated prescriptions.        Dose/Directions    carbidopa-levodopa  MG per tablet   Commonly known as:  SINEMET   This may have changed:    - how much to take  - how to take this  - when to take this  - additional instructions   Used for:  Parkinson's disease (H)   Changed by:  Andrés Panda MD        Dose:  2 tablet   Take 2 tablets by mouth 4 times daily at 8 AM, 12 PM, 4 PM,  and 8 PM.   Quantity:  720 tablet   Refills:  1            Where to get your medicines      These medications were sent to [id number] - [place], [place] - [address] AT [address]  [address], [place] [address]    Hours:  24-hours Phone:  [phone]     carbidopa-levodopa  MG per tablet                Primary Care Provider Office Phone # Fax #    Telly Gold [phone]       [facility] [address] [place] [id number]        Equal Access to Services     ZULMA ANGELO AH: Nolan lozano Soghanshyam, waaxda luqadaha, qaybta kaalmada adeegyatrav, ashvin syed. [facility] [phone].    ATENCIÓN: Si  buddy bernstein, tiene a calzada disposición servicios gratuitos de asistencia lingüística. [facility] [phone].    We comply with applicable federal civil rights laws and [place] laws. We do not discriminate on the basis of race, color, national origin, age, disability, sex, sexual orientation, or gender identity.            Thank you!     Thank you for choosing [facility]  for your care. Our goal is always to provide you with excellent care. Hearing back from our patients is one way we can continue to improve our services. Please take a few minutes to complete the written survey that you may receive in the mail after your visit with us. Thank you!             Your Updated Medication List - Protect others around you: Learn how to safely use, store and throw away your medicines at www.disposemymeds.org.          This list is accurate as of 8/3/18  3:40 PM.  Always use your most recent med list.                   Brand Name Dispense Instructions for use Diagnosis    amLODIPine 10 MG tablet    NORVASC     TAKE ONE TABLET BY MOUTH DAILY    Extrapyramidal disorder, Memory loss       B-12 1000 MCG Tbcr     100 tablet    Take 1,000 mcg by mouth daily    Vitamin B12 deficiency (non anemic)       carbidopa-levodopa  MG per tablet    SINEMET    720 tablet    Take 2 tablets by mouth 4 times daily at 8 AM, 12 PM, 4 PM,  and 8 PM.    Parkinson's disease (H)       hydrochlorothiazide 25 MG tablet    HYDRODIURIL     Take 25 mg by mouth    Extrapyramidal disorder, Memory loss       polyethylene glycol Packet    MIRALAX/GLYCOLAX     Take 17 g by mouth    Extrapyramidal disorder, Memory loss

## 2018-08-03 NOTE — PROGRESS NOTES
ESTABLISHED PATIENT NEUROLOGY NOTE    DATE OF VISIT: 8/3/2018  CLINIC LOCATION: [facility]  MRN: [id number]  PATIENT NAME: Sánchez Castellanos  YOB: 1940    PCP: Telly Gold DO    REASON FOR VISIT:   Chief Complaint   Patient presents with     Parkinson     sx are the same and want to discuss if Sinemet medication might be causing his constipation     SUBJECTIVE:                                                      HISTORY OF PRESENT ILLNESS: Patient is here for follow up regarding his Parkinson's disease.  He was last seen on 05/04/2018.  At that time, his Sinemet dose was further increased to 2 tablets 4 times daily.  Please refer to my initial/other prior notes for further information.  The patient is accompanied by his wife, who participates in interview.    Since the last visit, the patient reports no significant interval changes of his symptoms.  He increased Sinemet dose as recommended without significant side effects, except constipation.  He had a fall while mowing his lawn at the end of May.  No other falls since then.    Patient's wife feels that the medication is very helpful for patient to move better.  No new neurological symptoms.    On review of systems, patient endorses no additional active complaints. Medications, allergies, family and social history were also reviewed. There are no changes reported by patient.  REVIEW OF SYSTEMS:                                                    10-system review was completed. Pertinent positives are included in HPI. The remainder of ROS is negative.  EXAM:                                                    Physical Exam:   Vitals: /77 (BP Location: Left arm, Patient Position: Sitting, Cuff Size: Adult Large)  Pulse 70  Temp 97.7  F (36.5  C) (Oral)  Resp 16  Wt 92.5 kg (204 lb)  SpO2 97%  BMI 24.19 kg/m2    General: pt is in NAD, cooperative.  Skin: normal turgor, moist mucous membranes, no lesions/rashes  noticed.  HEENT: ATNC, white sclera, normal conjunctiva.  Respiratory: Symmetric lung excursion, no accessory respiratory muscle use.  Abdomen: Non distended.  Neurological: awake, cooperative, follows commands, no aphasia or dysarthria noted, cranial nerves II-XII: mild hypomimia and hypophonia, hearing is reduced bilaterally, equally moves all extremities, mild bradykinesia, stooped forward posture, shuffling gait with diminished arm swings.  ASSESSMENT and PLAN:                                                    Assessment: 77-year-old male patient with Parkinson's disease presents for follow-up.  He is on 25/100 mg Sinemet 2 tablets 4 times daily without noticeable side effects, except constipation.  We discussed with the patient that it could be side effect of Sinemet versus a symptom of Parkinson's disease itself.  For that reason, I advised the patient to discuss treatment of constipation with his primary care provider first before we make any Sinemet reduction as it does affect his ability to move.  Otherwise, he is clinically stable on the current regimen.  We reviewed other options (DA and COMT inhibitors), but no medication changes are needed at this point.    Diagnoses:    ICD-10-CM    1. Parkinson's disease (H) G20 carbidopa-levodopa (SINEMET)  MG per tablet     Plan:At today's visit we thoroughly discussed the current symptoms, treatment options, and the plan.  We will not make any medication changes at this time.    I advised the patient to discuss the treatment of constipation with his primary care provider.    Next follow-up appointment is in the next 6 months or earlier if needed.    I encouraged the patient to call me with any questions or concerns.    Total Time: 28 minutes with > 50% spent counseling the patient on stated above assessment and recommendations, including review of current symptoms and the plan of treatment.    Andrés Panda MD  / Neurology

## 2018-08-03 NOTE — PATIENT INSTRUCTIONS
AFTER VISIT SUMMARY (AVS):    At today's visit we thoroughly discussed the current symptoms, treatment options, and the plan.  We will not make any medication changes at this time.    Please discuss the treatment of constipation with your primary care provider.    Next follow-up appointment is in the next 6 months or earlier if needed.    Please do not hesitate to call me with any questions or concerns.    Thanks.

## 2018-08-06 NOTE — PROGRESS NOTES
Fax last office visit note to Dr. Kevin at [facility] at [phone].      Liset Lovell MA  Neurology Department

## 2018-08-16 ENCOUNTER — RECORDS - HEALTHEAST (OUTPATIENT)
Dept: ADMINISTRATIVE | Facility: OTHER | Age: 78
End: 2018-08-16

## 2018-09-20 ENCOUNTER — COMMUNICATION - HEALTHEAST (OUTPATIENT)
Dept: INTERNAL MEDICINE | Facility: CLINIC | Age: 78
End: 2018-09-20

## 2018-09-20 DIAGNOSIS — I10 ESSENTIAL HYPERTENSION: ICD-10-CM

## 2018-12-19 ENCOUNTER — COMMUNICATION - HEALTHEAST (OUTPATIENT)
Dept: INTERNAL MEDICINE | Facility: CLINIC | Age: 78
End: 2018-12-19

## 2018-12-21 ENCOUNTER — OFFICE VISIT - HEALTHEAST (OUTPATIENT)
Dept: GERIATRICS | Facility: CLINIC | Age: 78
End: 2018-12-21

## 2018-12-21 ENCOUNTER — PATIENT OUTREACH (OUTPATIENT)
Dept: CARE COORDINATION | Facility: CLINIC | Age: 78
End: 2018-12-21

## 2018-12-21 ENCOUNTER — COMMUNICATION - HEALTHEAST (OUTPATIENT)
Dept: SCHEDULING | Facility: CLINIC | Age: 78
End: 2018-12-21

## 2018-12-21 DIAGNOSIS — K59.03 DRUG-INDUCED CONSTIPATION: ICD-10-CM

## 2018-12-21 DIAGNOSIS — R46.89 COGNITIVE AND BEHAVIORAL CHANGES: ICD-10-CM

## 2018-12-21 DIAGNOSIS — I63.9 ISCHEMIC STROKE (H): ICD-10-CM

## 2018-12-21 DIAGNOSIS — G20.A1 PARKINSON DISEASE (H): ICD-10-CM

## 2018-12-21 DIAGNOSIS — R13.12 OROPHARYNGEAL DYSPHAGIA: ICD-10-CM

## 2018-12-21 DIAGNOSIS — R41.89 COGNITIVE AND BEHAVIORAL CHANGES: ICD-10-CM

## 2018-12-21 DIAGNOSIS — G47.9 SLEEP DIFFICULTIES: ICD-10-CM

## 2018-12-21 DIAGNOSIS — I10 ESSENTIAL HYPERTENSION: ICD-10-CM

## 2018-12-21 DIAGNOSIS — N39.0 URINARY TRACT INFECTION WITHOUT HEMATURIA, SITE UNSPECIFIED: ICD-10-CM

## 2018-12-21 NOTE — LETTER
[facility]   To:   [facility]          Please give to facility    From:  Blanca Logan  \[facility] [place] [facility]\""  Care Coordinator [phone]   [facility]   P: [phone]  [email or web address]   Patient Name:  Sánchez Castellanos YOB: 1940   Admit date: 11-      *Information Needed:  Please contact me when the patient will discharge (or if they will move to long term care)- include the discharge date, disposition, and main diagnosis   - If the patient is discharged with home care services, please provide the name of the agency    Phone or Email with information  Thank you, Blanca  P: [phone]  [email or web address]

## 2018-12-21 NOTE — PROGRESS NOTES
Clinic Care Coordination Contact  Care Coordination Transition Communication    Referral Source: IP Report    From chart review:       Admission Date: 12/10/2018.   Discharge Date and Time: 12/20/2018  1:52 PM   Admission Diagnoses: Confusion [R41.0]  Urinary tract infection [N39.0]   Principal Diagnosis:  Ischemic stroke (H)  Discharge Diagnoses:  Principal Problem:    Ischemic stroke (H)  Active Problems:    Parkinson disease (H)    Essential hypertension    Acute metabolic encephalopathy    Urinary tract infection    JB (acute kidney injury) (H)    Oropharyngeal dysphagia    Goals of care, counseling/discussion    Palliative care patient    Cognitive and behavioral changes    Mood disorder due to medical condition    Sleep difficulties        Hospital Summary:   Sánchez Castellanos is a 78 y.o. year old male who was admitted to [facility] on 12/10/2018 for ischemic stroke.     He has a history of Parkinson's disease and had the typical bradykinesia from that, but was cognitively sharp and did not have any obvious in that regard, but suddenly became confused.  The emergency room diagnosis was urine tract infection, and urine culture did grow out Pseudomonas.  He was treated with IV cephalosporin for 3 days under advice from infectious disease consultant and this was completed well he was in the hospital.     Although the initial CT scan was negative in the emergency room, follow-up MRI of the brain demonstrated left MCA territory stroke which was moderate in stroke burden.  There was some suspicion of dysphasia prior to the stroke manifest as some coughing while he was eating, but ended up with severe dysphasia and was evaluated several times by speech pathology with video swallow study, and he demonstrated no improvement, in fact the last study on the day of discharge was even worse.  He has silent aspiration, so it is not always manifest as coughing or choking.  This puts him at a very high risk of  aspiration pneumonia.  After talking with the palliative care team and his family, Ana expresses that he would not want a feeding tube, this would also be my recommendation to avoid having a feeding tube.  CODE STATUS established to be DNR, but he is not at the point of hospice care in Ana's eyes.       Discharge was significantly delayed due to intermittent agitation and wandering behavior.  Psychiatry was consulted and olanzapine was initiated to help with episodes of agitation and confusion.  The olanzapine did help with the agitation, though he still intermittently wandered.     In regards to deficits after this stroke, it is clear that he has dysphasia, some degree of a aphasia, coming up with the right word, and I think he has some permanent cognitive impairments as well.  Throughout the hospitalization, he was not aware of why he was there, where he was, etc.  There were some familiar things to him such as when family was present, he could revert to talk about family matters and family members.  I am concerned that his long-term outlook is poor.     He has continued problems with constipation, bowel regimen was prescribed.      Transition to Facility:              Facility Name: [facility]                 Plan: RN/SW Care Coordinator will await notification from facility staff informing RN/SW Care Coordinator of patient's discharge plans/needs. RN/SW Care Coordinator will review chart and outreach to facility staff every 4 weeks and as needed.     Blanca Logan,   [facility]  [email or web address]  [phone]

## 2018-12-24 ENCOUNTER — OFFICE VISIT - HEALTHEAST (OUTPATIENT)
Dept: GERIATRICS | Facility: CLINIC | Age: 78
End: 2018-12-24

## 2018-12-24 DIAGNOSIS — G20.A1 PARKINSON DISEASE (H): ICD-10-CM

## 2018-12-24 DIAGNOSIS — K59.03 DRUG-INDUCED CONSTIPATION: ICD-10-CM

## 2018-12-24 DIAGNOSIS — N17.9 AKI (ACUTE KIDNEY INJURY) (H): ICD-10-CM

## 2018-12-24 DIAGNOSIS — I10 ESSENTIAL HYPERTENSION: ICD-10-CM

## 2018-12-24 DIAGNOSIS — F06.30 MOOD DISORDER DUE TO MEDICAL CONDITION: ICD-10-CM

## 2018-12-24 DIAGNOSIS — R46.89 COGNITIVE AND BEHAVIORAL CHANGES: ICD-10-CM

## 2018-12-24 DIAGNOSIS — I63.9 ISCHEMIC STROKE (H): ICD-10-CM

## 2018-12-24 DIAGNOSIS — G47.9 SLEEP DIFFICULTIES: ICD-10-CM

## 2018-12-24 DIAGNOSIS — R41.89 COGNITIVE AND BEHAVIORAL CHANGES: ICD-10-CM

## 2018-12-28 ENCOUNTER — OFFICE VISIT - HEALTHEAST (OUTPATIENT)
Dept: GERIATRICS | Facility: CLINIC | Age: 78
End: 2018-12-28

## 2018-12-28 DIAGNOSIS — G20.A1 PARKINSON DISEASE (H): ICD-10-CM

## 2018-12-28 DIAGNOSIS — R46.89 COGNITIVE AND BEHAVIORAL CHANGES: ICD-10-CM

## 2018-12-28 DIAGNOSIS — I63.9 ISCHEMIC STROKE (H): ICD-10-CM

## 2018-12-28 DIAGNOSIS — N39.0 URINARY TRACT INFECTION WITHOUT HEMATURIA, SITE UNSPECIFIED: ICD-10-CM

## 2018-12-28 DIAGNOSIS — R41.89 COGNITIVE AND BEHAVIORAL CHANGES: ICD-10-CM

## 2018-12-28 DIAGNOSIS — K59.03 DRUG-INDUCED CONSTIPATION: ICD-10-CM

## 2018-12-28 DIAGNOSIS — G47.9 SLEEP DIFFICULTIES: ICD-10-CM

## 2018-12-29 ENCOUNTER — RECORDS - HEALTHEAST (OUTPATIENT)
Dept: LAB | Facility: CLINIC | Age: 78
End: 2018-12-29

## 2018-12-29 LAB
ANION GAP SERPL CALCULATED.3IONS-SCNC: 11 MMOL/L (ref 5–18)
BUN SERPL-MCNC: 57 MG/DL (ref 8–28)
CALCIUM SERPL-MCNC: 8.9 MG/DL (ref 8.5–10.5)
CHLORIDE BLD-SCNC: 99 MMOL/L (ref 98–107)
CO2 SERPL-SCNC: 27 MMOL/L (ref 22–31)
CREAT SERPL-MCNC: 2.39 MG/DL (ref 0.7–1.3)
GFR SERPL CREATININE-BSD FRML MDRD: 26 ML/MIN/1.73M2
GLUCOSE BLD-MCNC: 78 MG/DL (ref 70–125)
POTASSIUM BLD-SCNC: 3.9 MMOL/L (ref 3.5–5)
SODIUM SERPL-SCNC: 137 MMOL/L (ref 136–145)

## 2018-12-30 ENCOUNTER — RECORDS - HEALTHEAST (OUTPATIENT)
Dept: LAB | Facility: CLINIC | Age: 78
End: 2018-12-30

## 2018-12-30 LAB
ALBUMIN UR-MCNC: ABNORMAL MG/DL
APPEARANCE UR: ABNORMAL
BACTERIA #/AREA URNS HPF: ABNORMAL HPF
BILIRUB UR QL STRIP: NEGATIVE
COLOR UR AUTO: YELLOW
GLUCOSE UR STRIP-MCNC: NEGATIVE MG/DL
HGB UR QL STRIP: NEGATIVE
HYALINE CASTS #/AREA URNS LPF: ABNORMAL LPF
KETONES UR STRIP-MCNC: ABNORMAL MG/DL
LEUKOCYTE ESTERASE UR QL STRIP: ABNORMAL
MUCOUS THREADS #/AREA URNS LPF: ABNORMAL LPF
NITRATE UR QL: NEGATIVE
PH UR STRIP: 5.5 [PH] (ref 4.5–8)
RBC #/AREA URNS AUTO: ABNORMAL HPF
SP GR UR STRIP: 1.02 (ref 1–1.03)
SQUAMOUS #/AREA URNS AUTO: ABNORMAL LPF
UROBILINOGEN UR STRIP-ACNC: ABNORMAL
WAXY CASTS #/AREA URNS LPF: ABNORMAL LPF
WBC #/AREA URNS AUTO: ABNORMAL HPF

## 2019-01-01 LAB — BACTERIA SPEC CULT: ABNORMAL

## 2019-01-07 ENCOUNTER — PATIENT OUTREACH (OUTPATIENT)
Dept: CARE COORDINATION | Facility: CLINIC | Age: 79
End: 2019-01-07

## 2019-01-07 ENCOUNTER — HOME CARE/HOSPICE - HEALTHEAST (OUTPATIENT)
Dept: HOME HEALTH SERVICES | Facility: HOME HEALTH | Age: 79
End: 2019-01-07

## 2019-01-07 NOTE — PROGRESS NOTES
Clinic Care Coordination Contact  Care Team Conversations    Message from TCU  at [place].  Patient is currently in hospital and is being discharged to [facility] and will not be returning the TCU.      CC did chart review and patient not discharged yet.      Plan- CC will do chart review after discharge from hospital to make sure discharged to the [facility].    Social Jenn Donaldson  [facility]  [email or web address]  [phone]    Clinic Care Coordination Contact    Situation: Patient chart reviewed by care coordinator.    Background: patient is living at the [facility].     Assessment: No need for care coordination.    Plan/Recommendations: no outreach by CC will be completed.    Social Jenn Donaldson  [facility]  [email or web address]  [phone]

## 2019-01-08 ENCOUNTER — AMBULATORY - HEALTHEAST (OUTPATIENT)
Dept: GERIATRICS | Facility: CLINIC | Age: 79
End: 2019-01-08

## 2019-01-09 ENCOUNTER — COMMUNICATION - HEALTHEAST (OUTPATIENT)
Dept: SCHEDULING | Facility: CLINIC | Age: 79
End: 2019-01-09

## 2019-01-17 ENCOUNTER — RECORDS - HEALTHEAST (OUTPATIENT)
Dept: LAB | Facility: CLINIC | Age: 79
End: 2019-01-17

## 2019-01-17 LAB
ALBUMIN SERPL-MCNC: 3.4 G/DL (ref 3.5–5)
ALP SERPL-CCNC: 75 U/L (ref 45–120)
ALT SERPL W P-5'-P-CCNC: <9 U/L (ref 0–45)
ANION GAP SERPL CALCULATED.3IONS-SCNC: 9 MMOL/L (ref 5–18)
AST SERPL W P-5'-P-CCNC: 8 U/L (ref 0–40)
BILIRUB SERPL-MCNC: 0.6 MG/DL (ref 0–1)
BUN SERPL-MCNC: 21 MG/DL (ref 8–28)
CALCIUM SERPL-MCNC: 8.8 MG/DL (ref 8.5–10.5)
CHLORIDE BLD-SCNC: 106 MMOL/L (ref 98–107)
CHOLEST SERPL-MCNC: 113 MG/DL
CO2 SERPL-SCNC: 24 MMOL/L (ref 22–31)
CREAT SERPL-MCNC: 0.88 MG/DL (ref 0.7–1.3)
ERYTHROCYTE [DISTWIDTH] IN BLOOD BY AUTOMATED COUNT: 12.6 % (ref 11–14.5)
FASTING STATUS PATIENT QL REPORTED: NORMAL
GFR SERPL CREATININE-BSD FRML MDRD: >60 ML/MIN/1.73M2
GLUCOSE BLD-MCNC: 87 MG/DL (ref 70–125)
HCT VFR BLD AUTO: 35 % (ref 40–54)
HDLC SERPL-MCNC: 51 MG/DL
HGB BLD-MCNC: 11.6 G/DL (ref 14–18)
LDLC SERPL CALC-MCNC: 51 MG/DL
MCH RBC QN AUTO: 32 PG (ref 27–34)
MCHC RBC AUTO-ENTMCNC: 33.1 G/DL (ref 32–36)
MCV RBC AUTO: 96 FL (ref 80–100)
PLATELET # BLD AUTO: 254 THOU/UL (ref 140–440)
PMV BLD AUTO: 10.3 FL (ref 8.5–12.5)
POTASSIUM BLD-SCNC: 3.8 MMOL/L (ref 3.5–5)
PROT SERPL-MCNC: 6.2 G/DL (ref 6–8)
RBC # BLD AUTO: 3.63 MILL/UL (ref 4.4–6.2)
SODIUM SERPL-SCNC: 139 MMOL/L (ref 136–145)
TRIGL SERPL-MCNC: 55 MG/DL
TSH SERPL DL<=0.005 MIU/L-ACNC: 0.26 UIU/ML (ref 0.3–5)
VIT B12 SERPL-MCNC: 209 PG/ML (ref 213–816)
WBC: 5.8 THOU/UL (ref 4–11)

## 2019-01-18 LAB — 25(OH)D3 SERPL-MCNC: 9.5 NG/ML (ref 30–80)

## 2019-01-25 ENCOUNTER — AMBULATORY - HEALTHEAST (OUTPATIENT)
Dept: OTHER | Facility: CLINIC | Age: 79
End: 2019-01-25

## 2019-02-21 ENCOUNTER — RECORDS - HEALTHEAST (OUTPATIENT)
Dept: ADMINISTRATIVE | Facility: OTHER | Age: 79
End: 2019-02-21

## 2019-03-20 ENCOUNTER — RECORDS - HEALTHEAST (OUTPATIENT)
Dept: ADMINISTRATIVE | Facility: OTHER | Age: 79
End: 2019-03-20

## 2019-03-21 ENCOUNTER — HOSPITAL ENCOUNTER (OUTPATIENT)
Dept: INTERVENTIONAL RADIOLOGY/VASCULAR | Facility: CLINIC | Age: 79
Discharge: HOME | End: 2019-03-21
Attending: UROLOGY

## 2019-04-29 ENCOUNTER — DOCUMENTATION ONLY (OUTPATIENT)
Dept: OTHER | Facility: CLINIC | Age: 79
End: 2019-04-29

## 2019-04-29 ENCOUNTER — AMBULATORY - HEALTHEAST (OUTPATIENT)
Dept: OTHER | Facility: CLINIC | Age: 79
End: 2019-04-29

## 2021-05-31 VITALS — BODY MASS INDEX: 25.21 KG/M2 | WEIGHT: 207 LBS | HEIGHT: 76 IN

## 2021-06-01 VITALS — WEIGHT: 206.7 LBS | HEIGHT: 78 IN | BODY MASS INDEX: 23.92 KG/M2

## 2021-06-02 VITALS — WEIGHT: 196 LBS | BODY MASS INDEX: 22.08 KG/M2

## 2021-06-02 VITALS — WEIGHT: 195 LBS | BODY MASS INDEX: 21.97 KG/M2

## 2021-06-10 NOTE — PROGRESS NOTES
ASSESSMENT:  1. Incidental lung nodule, > 3mm and < 8mm  Sánchez Castellanos is a 76-year-old man who presents for follow-up.  He is long overdue for follow-up including for the pulmonary nodule (7 mm noncalcified right lower lobe nodule).  He is considered high risk due to smoking status.  Stability should be documented over 2 year interval.  He may need one additional CT after this 1 depending on results, since the initial one was September 2015.  I explained that 90-95% of the time these lung nodules are benign, but requires follow-up.  - CT Chest Without Contrast; Future    2. Essential hypertension  Blood pressure is above goal, currently on amlodipine 10 mg daily.  Add hydrochlorothiazide, which may have some partial improvement in the left leg swelling.  - Basic Metabolic Panel    3. Leg swelling  We previously discussed this, recommended ultrasound, but did not follow through with it.  Recheck albumin, urine protein previously was negative.  Amlodipine could certainly make venous insufficiency appear worse than it is.  If ultrasound is normal, would recommend compression stocking to the left leg.  He had a CT of the abdomen September 2015 when he had symptoms, without sign of proximal obstruction.  - US Venous Leg Left; Future  - Albumin    4. Movement disorder  High suspicion for Parkinson's, though he is lacking the pill-rolling tremor.  He seems to have every other feature, however.  I strongly recommended considering seeing a neurologist, as we discussed in the past.  I think that he might benefit from Sinemet or similar medication because of some of the movement related symptoms including freezing when walking through the threshold of the door, en bloc turning, shuffling gait, etc.  - Ambulatory referral to Neurology  --He expressed some reservation about seeing other doctors, and stated that he only wanted to live until age 80.  I think that treatment of Parkinson's could improve his quality of  life.      PLAN:  Patient Instructions   Pulmonary nodule-- need follow-up chest CT scan    Check ultrasound of the left leg    High blood pressure-- start hydrochlorothiazide    Blood test today       Orders Placed This Encounter   Procedures     CT Chest Without Contrast     Standing Status:   Future     Standing Expiration Date:   5/3/2018     Order Specific Question:   Reason for Exam (Describe Symptoms):     Answer:   follow-up lung nodule     Order Specific Question:   Can the procedure be changed per Radiologist protocol?     Answer:   Yes     US Venous Leg Left     Standing Status:   Future     Standing Expiration Date:   5/3/2018     Order Specific Question:   Reason for Exam (Describe Symptoms):     Answer:   left leg swelling, chronic. Check for valvular insufficiency     Order Specific Question:   Can the procedure be changed per Radiologist protocol?     Answer:   Yes     Basic Metabolic Panel     Albumin     Ambulatory referral to Neurology     Referral Priority:   Routine     Referral Type:   Consultation     Referral Reason:   Evaluation and Treatment     Referral Location:   [facility]     Requested Specialty:   Neurology     Number of Visits Requested:   1     Medications Discontinued During This Encounter   Medication Reason     amLODIPine (NORVASC) 10 MG tablet Duplicate order     HYDROcodone-acetaminophen (NORCO) 5-325 mg per tablet Therapy completed       Return in about 3 months (around 8/3/2017) for Recheck.    CHIEF COMPLAINT:  Chief Complaint   Patient presents with     Follow-up     weakness in legs, mainly in the left leg       HISTORY OF PRESENT ILLNESS:  Sánchez is a 76 y.o. male presenting to the clinic today lower extremity weakness that is primarily in his left leg. He feels overall he is slowing down and he is moving slower and slower each day. He has started ambulating with a cane and is unable to pivot well. He feels the weakness is spreading from his left  "leg to his right leg. He endorses a heavy feeling in his left leg that makes it difficult to lift. He is also experiencing significant edema in his left leg compared to the right. This weakness has been slowly progressing the last 3 years or so. His edema is only itchy periodically. The edema does not extend past his knee. He has no difficulties moving his ankles. He has not fallen yet. He is only using his cane to ambulate outside.    Mood/Memory Changes: He reports occasional lapses in memory that last about 30-40 seconds. He endorses having an increased temper and states that he has now raised his voice at his wife multiple times. This is very uncharacteristic of him and he does not like it. He has not seen a neurologist. He does not have a resting tremor. He endorses periodic weakness in his hands.     Hypertension: He endorses elevated BP most of the time. He continues to take amlodipine.    Prostate enlargement: He is recovering well from his prostate cystoscopy. He is scheduled to see Dr. Henning in August.     REVIEW OF SYSTEMS:   He has been told 3 times in his life that he does not have cancer. He would prefer not to see any other doctors. His only goal is to make it to 80 years of age. All other systems are negative.    PFSH:  Reviewed as below.     TOBACCO USE:  History   Smoking Status     Current Every Day Smoker     Packs/day: 4.00     Years: 10.00     Types: Cigars   Smokeless Tobacco     Never Used     Comment: cigars 4/day       VITALS:  Vitals:    05/03/17 1316 05/03/17 1321 05/03/17 1355   BP: (!) 160/100 (!) 156/92 143/85   Patient Site:   Left Arm   Patient Position:   Sitting   Pulse: 71     Resp: 14     Weight: 207 lb (93.9 kg)     Height: 6' 3.5\" (1.918 m)       Wt Readings from Last 3 Encounters:   05/03/17 207 lb (93.9 kg)   03/03/16 200 lb (90.7 kg)   02/26/16 207 lb (93.9 kg)     Body mass index is 25.53 kg/(m^2).    PHYSICAL EXAM:    General: Alert, pleasant, no distress  Psychiatric: " Flat affect  Lymphatic: 2+ edema left ankle. None in right   Neuro: Strength full unless otherwise noted: 4/5 strength with left ankle plantar flexion. 4/5 left hip flexion. Knees: 4+ flexion on right. 4 flexion on left.   No tremor. Masked facies. Shuffling gait. Stooped posture. Delayed responses to questions..   BP: 143/85    ADDITIONAL HISTORY SUMMARIZED (2): Reviewed pre-op from 2/26/16 regarding left leg weakness. Reviewed uro note from 12/29/16.  DECISION TO OBTAIN EXTRA INFORMATION (1): None.   RADIOLOGY TESTS (1): Reviewed CT from 9/17/17 regarding prostate cystoscopy. Ordered US of left lower leg. Ordered chest CT  LABS (1): Ordered labs.   MEDICINE TESTS (1): None.  INDEPENDENT REVIEW (2 each): None.     The visit lasted a total of 33 minutes face to face with the patient. Over 50% of the time was spent counseling and educating the patient about weakness.    IFrancisco, am scribing for and in the presence of, Dr. Kevin.    IDr. Kevin, personally performed the services described in this documentation, as scribed by Francisco Mayes in my presence, and it is both accurate and complete.    MEDICATIONS:  Current Outpatient Prescriptions   Medication Sig Dispense Refill     amLODIPine (NORVASC) 10 MG tablet TAKE ONE TABLET BY MOUTH DAILY 90 tablet 0     oxybutynin (DITROPAN) 5 MG tablet Take 1 tablet (5 mg total) by mouth 2 (two) times a day. For bladder spasms 14 tablet 0     polyethylene glycol (MIRALAX) 17 gram packet Take 17 g by mouth daily as needed.        hydroCHLOROthiazide (HYDRODIURIL) 25 MG tablet Take 1 tablet (25 mg total) by mouth daily. 30 tablet 11     No current facility-administered medications for this visit.        Total data points:4

## 2021-06-18 NOTE — PROGRESS NOTES
[facility] Note    Sánchez Castellanos   77 y.o. male    Date of Visit: 6/14/2018  Chief Complaint   Patient presents with     Suture / Staple Removal     placed 03/31/2018       ASSESSMENT/PLAN  1. Parkinson disease (H)     2. Lung nodule  CT Chest Without Contrast   3. Essential hypertension       ---------------------------------------------    1.  Parkinson disease, on Sinemet and managed at [facility].  I cautioned him about his poor postural stability/balance and to avoid slopes.  He recovered well from the fall, well sutured in the emergency room, all sutures were completely removed today.    2.  Notable for 9 mm lung nodule which was not demonstrated on CT done in 2015.  He did not follow-up as recommended on the CT which was ordered May 2017.  I have ordered it again, again advised him to follow-up on it.    3.  Blood pressure well managed at this point    Return for Annual physical.      SUBJECTIVE  Sánchez Castellanos is a 77-year-old man with history of Parkinson disease who is here with his wife for follow-up from an ER visit on 5/31.  He is mowing the grass on a slope, fell forward and struck his head, ended up having a scalp laceration on the right forehead as well as above the right eyebrow.  6 sutures were used to secure the forehead laceration and to to repair the eyebrow lesion.  They have not noticed any trouble with the skin around the area.  Sánchez is nervous about this procedure hurting.    Incidentally, pulmonary nodule noted at the right apex of the lung, which was not demonstrated on CT chest/abdomen/pelvis done in 2015.  Follow-up chest CT was not pursued when I ordered it on May 3, 2017.    He sees Dr. Panda at [facility] for Parkinson's disease.  He is on Sinemet, dose frequently throughout the day.  As was the case and reporting to his neurologist, he tells me that the Sinemet has not helped.  Ana correct him and tells me that he moves better with the medication.  He is adamant  "about not wanting to do physical therapy.      Medications, allergies, and problem list were reviewed and updated    Patient Active Problem List   Diagnosis     Constipation     Incidental lung nodule, > 3mm and < 8mm     Leg weakness     Parkinson disease (H)     Essential hypertension     Leg swelling     Past Medical History:   Diagnosis Date     Acute kidney injury (H)      BPH (benign prostatic hyperplasia)      Constipation      Enlarged prostate      Hypertension      Lung nodule      Parkinson disease (H)      Urinary retention 9/17/2015     Current Outpatient Prescriptions   Medication Sig Dispense Refill     amLODIPine (NORVASC) 10 MG tablet TAKE ONE TABLET BY MOUTH DAILY 90 tablet 3     carbidopa-levodopa (SINEMET)  mg per tablet Week 1: Take 2 tablets at 8 AM, 12 PM, 4 PM, and take 1 tablet at 8 PM. Week 2: Take 2 tablets at 8 AM, 12 PM, 4 PM,  and 8 PM.       hydroCHLOROthiazide (HYDRODIURIL) 25 MG tablet Take 1 tablet (25 mg total) by mouth daily. 90 tablet 0     polyethylene glycol (MIRALAX) 17 gram packet Take 17 g by mouth daily as needed.        No current facility-administered medications for this visit.      No Known Allergies    EXAM  Vitals:    06/14/18 1331   BP: 122/70   Patient Site: Left Arm   Patient Position: Sitting   Cuff Size: Adult Regular   Pulse: 72   Resp: 16   Weight: 206 lb 11.2 oz (93.8 kg)   Height: 6' 7\" (2.007 m)         General: Alert, no distress  Neurologic: Masked facies, slow movements, but he is able to get up under his own power, walk down the hallway without difficulty, though gait is somewhat slow.  No resting tremor noted.  Skin: Well-healed laceration right forehead and right eyebrow.    8 simple interrupted sutures (including one corner stitch) removed from right forehead after cleansing area with rubbing alcohol.  Same procedure followed with the eyebrow laceration (2 sutures).  No bleeding, no signs of infection.    Results reviewed: ER note reviewed in " addition to CT spine and head (normal aside from pulmonary nodule- 8 mm right apex)    Lab Results   Component Value Date    WBC 7.9 05/31/2018    HGB 13.6 (L) 05/31/2018    HCT 40.2 05/31/2018    MCV 94 05/31/2018     05/31/2018        Data points: 4    Telly Kevin,   Internal Medicine  [facility]

## 2021-06-18 NOTE — LETTER
Letter by Henry Harvey NP at      Author: Henry Harvey NP Service: -- Author Type: --    Filed:  Encounter Date: 12/28/2018 Status: (Other)         Patient: Sánchez Castellanos   MR Number: [id number]   YOB: 1940   Date of Visit: 12/28/2018     [facility]      Facility:    [facility] [[id number]]  Code Status: POLST AVAILABLE      Chief Complaint/Reason for Visit:          318  Chief Complaint   Patient presents with   ? Review Of Multiple Medical Conditions       HPI:   Sánchez is a 78 y.o. male who a recent transfer from [facility] with admission on 12/10/18 and discharged on 12/20/18 who was admitted for an ischemic stroke.  He has a past medical history of Parkinson's disease who had bradykinesia though cognitively sharp who apparently became suddenly confused.  In the hospital he was diagnosed with a UTI which grown Pseudomonas, was treated with an IV cephalosporin for 3 days per ID consult.  Initial CT was negative though follow-up MRI showed left MCA territory stroke with moderate stroke burden.  He developed severe dysphagia with asphasia, evaluated several times by speech, had a video swallow study, though demonstrated no improvement.  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  Per discussion with family he would not want a feeding tube.  Also family does not recognize him as hospice candidate presently.  Unfortunately due to his stroke he had difficulty being discharged per intermittent agitation and wandering behavior.  He was socially seen by psychiatry and started on olanzapine with Seroquel.  He also was later transferred to the TCU for rehab and possible discussion per change in disposition given poor longterm outlook.    Today, he seems that issues have improved, though still having behavioral issues during the day, sleeping improved.  Also have noticed some ongoing  hypotension with oral intake variable per modified diet. Patient denies pain, headache, chest pain, numbness or tingling, shortest of breath, eating or swallowing concerns, nausea or vomiting, diarrhea or bowel abnormalities, or no new integumentary concerns today.    Past Medical History:  Past Medical History:   Diagnosis Date   ? Acute kidney injury (H)    ? BPH (benign prostatic hyperplasia)    ? Constipation    ? Enlarged prostate    ? Hypertension    ? Ischemic stroke (H) 2018   ? Lung nodule    ? Parkinson disease (H)    ? Urinary retention 2015           Surgical History:  Past Surgical History:   Procedure Laterality Date   ? CATARACT EXTRACTION     ? CYSTOSCOPY     ? CYSTOSCOPY PROSTATE W/ LASER N/A 3/2/2016    Procedure: CYSTOSCOPY, GREENLIGHT LASER TRANSURETHRAL RESECTION OF PROSTATE  ;  Surgeon: David Henning MD;  Location: [facility];  Service:    ? CYSTOSCOPY PROSTATE W/ LASER N/A 3/2/2016    Procedure: MONOPOLAR TRANSURETHRAL RESECTION OF PROSTATE;  Surgeon: David Henning MD;  Location: [facility];  Service:    ? EYE SURGERY      Cataract   ? FINGER SURGERY Left        Family History:   Family History   Problem Relation Age of Onset   ? Cancer Father          at age of 50   ? Lung cancer Mother          at age of 78   ? Clotting disorder Neg Hx        Social History:    Social History     Socioeconomic History   ? Marital status:      Spouse name: Not on file   ? Number of children: Not on file   ? Years of education: Not on file   ? Highest education level: Not on file   Social Needs   ? Financial resource strain: Not on file   ? Food insecurity - worry: Not on file   ? Food insecurity - inability: Not on file   ? Transportation needs - medical: Not on file   ? Transportation needs - non-medical: Not on file   Occupational History   ? Not on file   Tobacco Use   ? Smoking status: Current Every Day Smoker     Packs/day: 4.00     Years: 10.00      Pack years: 40.00     Types: Cigars   ? Smokeless tobacco: Never Used   ? Tobacco comment: cigars 4/day   Substance and Sexual Activity   ? Alcohol use: No   ? Drug use: No   ? Sexual activity: Not on file   Other Topics Concern   ? Not on file   Social History Narrative     and has two children           Review of Systems   Constitutional: Positive for activity change and appetite change. Negative for chills, diaphoresis, fatigue and fever.   HENT: Positive for hearing loss. Negative for congestion and facial swelling.    Eyes: Negative for photophobia, redness and visual disturbance.   Respiratory: Negative for cough, shortness of breath and stridor.    Cardiovascular: Negative.    Gastrointestinal: Positive for constipation. Negative for abdominal distention, abdominal pain, blood in stool, diarrhea and nausea.   Endocrine: Negative.    Genitourinary:        Incontinent   Musculoskeletal:        Denies pain   Skin:        intact   Allergic/Immunologic: Negative.    Neurological: Positive for speech difficulty. Negative for tremors, syncope and weakness.        Slow   Hematological: Negative.    Psychiatric/Behavioral: Positive for agitation, confusion and sleep disturbance.        Wandering at HS       Vitals:    12/30/18 1043   BP: 114/75   Pulse: 67   Resp: 18   Temp: 97  F (36.1  C)   SpO2: 95%   Weight: 195 lb (88.5 kg)       Physical Exam   Constitutional: No distress.   Sundowning, improved   HENT:   Head: Normocephalic and atraumatic.   Mouth/Throat: Oropharynx is clear and moist. No oropharyngeal exudate.   Neck: Normal range of motion. No JVD present. No tracheal deviation present.   intact   Cardiovascular: Normal rate, regular rhythm and normal heart sounds. Exam reveals no gallop and no friction rub.   No murmur heard.  Pulmonary/Chest: No stridor. No respiratory distress. He has no wheezes. He has no rales.   Dim, RA   Abdominal: Soft. Bowel sounds are normal. He exhibits no distension.  There is no tenderness. There is no rebound.   Constipation, improved, known aspirator with speech following   Genitourinary:   Genitourinary Comments: incontient   Musculoskeletal: He exhibits no edema, tenderness or deformity.   Uses walker, no weakness noted   Lymphadenopathy:     He has no cervical adenopathy.   Neurological: He is alert.   A/O x1, no recall, asphasic, word finding issues   Skin: Skin is warm and dry. He is not diaphoretic.   intact   Psychiatric:   Agitation at times, wandering at HS, using zypreza and seroquel, less behaviors noted at HS       Medication List:  Current Outpatient Medications   Medication Sig   ? amLODIPine (NORVASC) 10 MG tablet Take 1 tablet (10 mg total) by mouth daily. (Patient taking differently: Take 10 mg by mouth daily. Hold for SBP <130      )   ? aspirin 81 mg chewable tablet Chew 1 tablet (81 mg total) daily.   ? atorvastatin (LIPITOR) 40 MG tablet Take 1 tablet (40 mg total) by mouth daily.   ? bisacodyl (DULCOLAX) 5 mg EC tablet Take 2 tablets (10 mg total) by mouth daily as needed for constipation.   ? carbidopa-levodopa (SINEMET)  mg per tablet Take 2 tablets by mouth 4 (four) times a day 0800, 1200, 1600, 2000.   ? melatonin 3 mg Tab tablet Take 1 tablet (3 mg total) by mouth at bedtime. insomnia (Patient taking differently: Take 6 mg by mouth at bedtime. insomnia      )   ? OLANZapine zydis (ZYPREXA) 5 MG disintegrating tablet Take 1 tablet (5 mg total) by mouth every 4 (four) hours as needed (agitation). (Patient taking differently: Take 5 mg by mouth at bedtime.       )   ? polyethylene glycol (MIRALAX) 17 gram packet Take 17 g by mouth daily.   ? QUEtiapine (SEROQUEL) 25 MG tablet Take 0.5 tablets (12.5 mg total) by mouth 3 (three) times a day. agitation (Patient taking differently: Take 25 mg by mouth 3 (three) times a day. agitation      )   ? QUEtiapine (SEROQUEL) 25 MG tablet Take 12.5 mg by mouth every 4 (four) hours as needed.   ? senna-docusate  (PERICOLACE) 8.6-50 mg tablet Take 2 tablets by mouth 2 (two) times a day. constipation   ? traZODone (DESYREL) 50 MG tablet Take 100 mg by mouth at bedtime. And 50mg at 1400             Labs:  Recent Results (from the past 48 hour(s))   Basic Metabolic Panel    Collection Time: 12/29/18  1:47 PM   Result Value Ref Range    Sodium 137 136 - 145 mmol/L    Potassium 3.9 3.5 - 5.0 mmol/L    Chloride 99 98 - 107 mmol/L    CO2 27 22 - 31 mmol/L    Anion Gap, Calculation 11 5 - 18 mmol/L    Glucose 78 70 - 125 mg/dL    Calcium 8.9 8.5 - 10.5 mg/dL    BUN 57 (H) 8 - 28 mg/dL    Creatinine 2.39 (H) 0.70 - 1.30 mg/dL    GFR MDRD Af Amer 32 (L) >60 mL/min/1.73m2    GFR MDRD Non Af Amer 26 (L) >60 mL/min/1.73m2     Lab Results   Component Value Date    WBC 7.2 12/10/2018    HGB 14.4 12/10/2018    HCT 41.9 12/10/2018    MCV 94 12/10/2018     12/17/2018     Lab Results   Component Value Date    TSH 0.86 12/13/2018     No results found for: [id number]    No results found for: HGBA1C    Assessment/Plan:  Left MC a territory stroke with moderate stroke burden: Initially was not found on CT the later found on MRI follow-up.  Follow-up with neuro as directed.    Dysphasia: speech following, had swallow study, does not want feeding tube, continue mech soft with honey thick    UTI: Found to be pan sensitive E. coli, given a cephalosporin for 3 days, monitor.    Agitation with sundowning: Const lengthy discharge process, now on olanzapine 5 mg at bedtime, increase Seroquel 25 mg 3 times daily and now 12.5 every 4 hours as needed, increase trazodone 100mg at HS and keep 50mg at 1400.  Additionally add melatonin 6 mg at bedtime. Continuing to monitor sleep.    Constipation: Continue senna S 2 tabs daily, start MiraLAX daily hold for diarrhea    Hypertension without HF: Continue amlodipine 10 mg daily, hold for SBP less than 130. Will decrease HCTZ to 12.5 mg daily per hypotension and variable oral intake, recheck BMP    Medical  management for stroke: Continue atorvastatin 40 mg and aspirin 81mg daily    Parkinson's disease: Continue Sinemet 50/100mg at 0800, 1200, 1600, 2000, no change    The care plan has been reviewed and all orders signed. Changes to care plan, if any, as noted. Otherwise, continue care plan of care.  The total time spent with this patient was 25 minutes per tx for ongoing sundowning and hypotension.    Electronically signed by: Henry Harvey NP

## 2021-06-22 NOTE — PROGRESS NOTES
[facility]      Facility:    [facility] [[id number]]  Code Status: POLST AVAILABLE      Chief Complaint/Reason for Visit:           Chief Complaint   Patient presents with     Review Of Multiple Medical Conditions       HPI:   Sánchez is a 78 y.o. male who a recent transfer from [facility] with admission on 12/10/18 and discharged on 12/20/18 who was admitted for an ischemic stroke.  He has a past medical history of Parkinson's disease who had bradykinesia though cognitively sharp who apparently became suddenly confused.  In the hospital he was diagnosed with a UTI which grown Pseudomonas, was treated with an IV cephalosporin for 3 days per ID consult.  Initial CT was negative though follow-up MRI showed left MCA territory stroke with moderate stroke burden.  He developed severe dysphagia with asphasia, evaluated several times by speech, had a video swallow study, though demonstrated no improvement.  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  Per discussion with family he would not want a feeding tube.  Also family does not recognize him as hospice candidate presently.  Unfortunately due to his stroke he had difficulty being discharged per intermittent agitation and wandering behavior.  He was socially seen by psychiatry and started on olanzapine with Seroquel.  He also was later transferred to the TCU for rehab and possible discussion per change in disposition given poor longterm outlook.    Previous met with wife and son to have concerns about how long he will be in therapy, what can be done for his sundowning, and his periodic constipation.  Though today, he seems that issues have improved.  Also have noticed some ongoing hypotension.Patient denies pain, headache, chest pain, numbness or tingling, shortest of breath, eating or swallowing concerns, nausea or vomiting, diarrhea or bowel abnormalities, or  no new integumentary concerns today.      Past Medical History:  Past Medical History:   Diagnosis Date     Acute kidney injury (H)      BPH (benign prostatic hyperplasia)      Constipation      Enlarged prostate      Hypertension      Ischemic stroke (H) 2018     Lung nodule      Parkinson disease (H)      Urinary retention 2015           Surgical History:  Past Surgical History:   Procedure Laterality Date     CATARACT EXTRACTION       CYSTOSCOPY       CYSTOSCOPY PROSTATE W/ LASER N/A 3/2/2016    Procedure: CYSTOSCOPY, GREENLIGHT LASER TRANSURETHRAL RESECTION OF PROSTATE  ;  Surgeon: David Henning MD;  Location: [facility];  Service:      CYSTOSCOPY PROSTATE W/ LASER N/A 3/2/2016    Procedure: MONOPOLAR TRANSURETHRAL RESECTION OF PROSTATE;  Surgeon: David Henning MD;  Location: [facility];  Service:      EYE SURGERY      Cataract     FINGER SURGERY Left        Family History:   Family History   Problem Relation Age of Onset     Cancer Father          at age of 50     Lung cancer Mother          at age of 78     Clotting disorder Neg Hx        Social History:    Social History     Socioeconomic History     Marital status:      Spouse name: Not on file     Number of children: Not on file     Years of education: Not on file     Highest education level: Not on file   Social Needs     Financial resource strain: Not on file     Food insecurity - worry: Not on file     Food insecurity - inability: Not on file     Transportation needs - medical: Not on file     Transportation needs - non-medical: Not on file   Occupational History     Not on file   Tobacco Use     Smoking status: Current Every Day Smoker     Packs/day: 4.00     Years: 10.00     Pack years: 40.00     Types: Cigars     Smokeless tobacco: Never Used     Tobacco comment: cigars 4/day   Substance and Sexual Activity     Alcohol use: No     Drug use: No     Sexual activity: Not on file   Other Topics  Concern     Not on file   Social History Narrative     and has two children           Review of Systems   Constitutional: Positive for activity change and appetite change. Negative for chills, diaphoresis, fatigue and fever.   HENT: Positive for hearing loss. Negative for congestion and facial swelling.    Eyes: Negative for photophobia, redness and visual disturbance.   Respiratory: Negative for cough, shortness of breath and stridor.    Cardiovascular: Negative.    Gastrointestinal: Positive for constipation. Negative for abdominal distention, abdominal pain, blood in stool, diarrhea and nausea.   Endocrine: Negative.    Genitourinary:        Incontinent   Musculoskeletal:        Denies pain   Skin:        intact   Allergic/Immunologic: Negative.    Neurological: Positive for speech difficulty. Negative for tremors, syncope and weakness.   Hematological: Negative.    Psychiatric/Behavioral: Positive for agitation, confusion and sleep disturbance.        Wandering at HS       There were no vitals filed for this visit.    Physical Exam   Constitutional: No distress.   Sundowning, improved   HENT:   Head: Normocephalic and atraumatic.   Mouth/Throat: Oropharynx is clear and moist. No oropharyngeal exudate.   Neck: Normal range of motion. No JVD present. No tracheal deviation present.   intact   Cardiovascular: Normal rate, regular rhythm and normal heart sounds. Exam reveals no gallop and no friction rub.   No murmur heard.  Pulmonary/Chest: No stridor. No respiratory distress. He has no wheezes. He has no rales.   Dim, RA   Abdominal: Soft. Bowel sounds are normal. He exhibits no distension. There is no tenderness. There is no rebound.   Constipation, improved, known aspirator with speech following   Genitourinary:   Genitourinary Comments: incontient   Musculoskeletal: He exhibits no edema, tenderness or deformity.   Uses walker, no weakness noted   Lymphadenopathy:     He has no cervical adenopathy.    Neurological: He is alert.   A/O x1, no recall, asphasic, word finding issues   Skin: Skin is warm and dry. He is not diaphoretic.   intact   Psychiatric:   Agitation at times, wandering at HS, using zypreza and seroquel, less behaviors noted       Medication List:  Current Outpatient Medications   Medication Sig     amLODIPine (NORVASC) 10 MG tablet Take 1 tablet (10 mg total) by mouth daily. (Patient taking differently: Take 10 mg by mouth daily. Hold for SBP <130      )     aspirin 81 mg chewable tablet Chew 1 tablet (81 mg total) daily.     atorvastatin (LIPITOR) 40 MG tablet Take 1 tablet (40 mg total) by mouth daily.     bisacodyl (DULCOLAX) 5 mg EC tablet Take 2 tablets (10 mg total) by mouth daily as needed for constipation.     carbidopa-levodopa (SINEMET)  mg per tablet Take 2 tablets by mouth 4 (four) times a day 0800, 1200, 1600, 2000.     hydroCHLOROthiazide (HYDRODIURIL) 25 MG tablet Take 1 tablet (25 mg total) by mouth daily. (Patient taking differently: Take 12.5 mg by mouth daily.       )     melatonin 3 mg Tab tablet Take 1 tablet (3 mg total) by mouth at bedtime. insomnia (Patient taking differently: Take 6 mg by mouth at bedtime. insomnia      )     OLANZapine zydis (ZYPREXA) 5 MG disintegrating tablet Take 1 tablet (5 mg total) by mouth every 4 (four) hours as needed (agitation). (Patient taking differently: Take 5 mg by mouth at bedtime.       )     polyethylene glycol (MIRALAX) 17 gram packet Take 17 g by mouth daily.     QUEtiapine (SEROQUEL) 25 MG tablet Take 0.5 tablets (12.5 mg total) by mouth 3 (three) times a day. agitation     QUEtiapine (SEROQUEL) 25 MG tablet Take 12.5 mg by mouth every 4 (four) hours as needed.     senna-docusate (PERICOLACE) 8.6-50 mg tablet Take 2 tablets by mouth 2 (two) times a day. constipation     traZODone (DESYREL) 50 MG tablet Take 50 mg by mouth 2 (two) times a day. Give at 1400 and HS       Labs:  Results for orders placed or performed during the  hospital encounter of 12/10/18   Basic Metabolic Panel   Result Value Ref Range    Sodium 136 136 - 145 mmol/L    Potassium 3.4 (L) 3.5 - 5.0 mmol/L    Chloride 105 98 - 107 mmol/L    CO2 21 (L) 22 - 31 mmol/L    Anion Gap, Calculation 10 5 - 18 mmol/L    Glucose 90 70 - 125 mg/dL    Calcium 8.7 8.5 - 10.5 mg/dL    BUN 25 8 - 28 mg/dL    Creatinine 0.85 0.70 - 1.30 mg/dL    GFR MDRD Af Amer >60 >60 mL/min/1.73m2    GFR MDRD Non Af Amer >60 >60 mL/min/1.73m2     Lab Results   Component Value Date    WBC 7.2 12/10/2018    HGB 14.4 12/10/2018    HCT 41.9 12/10/2018    MCV 94 12/10/2018     12/17/2018     Lab Results   Component Value Date    TSH 0.86 12/13/2018     No results found for: [id number]    No results found for: HGBA1C    Assessment/Plan:  Left MC a territory stroke with moderate stroke burden: Initially was not found on CT the later found on MRI follow-up.  Follow-up with neuro as directed.    Dysphasia: Have speech to evaluate and treat, had swallow study, does not want feeding tube    UTI: Found to be pan sensitive E. coli, given a cephalosporin for 3 days, monitor.    Agitation with sundowning: Const lengthy discharge process, now on olanzapine 5 mg at bedtime, Seroquel 12.5 mg 3 times daily and now every 4 hours as needed, start trazodone 50 mg at 1400 and at bedtime.  Additionally add melatonin 6 mg at bedtime. Continuing to monitor sleep.    Constipation: Continue senna S 2 tabs daily, start MiraLAX daily hold for diarrhea    Hypertension: Continue amlodipine 10 mg daily, hold for SBP less than 130. Will decrease HCTZ to 12.5 mg daily, recheck BMP    Medical management for stroke: Continue atorvastatin 40 mg and aspirin 81mg daily    Parkinson's disease: Continue Sinemet 50/100mg at 0800, 1200, 1600, 2000    The care plan has been reviewed and all orders signed. Changes to care plan, if any, as noted. Otherwise, continue care plan of care.  The total time spent with this patient was 25 min per  tx for ongoing sundowning and hypotension.    Electronically signed by: Henry Harvey NP

## 2021-06-22 NOTE — PROGRESS NOTES
[facility]      Facility:    [facility] [[id number]]  Code Status: POLST AVAILABLE      Chief Complaint/Reason for Visit:           Chief Complaint   Patient presents with     Review Of Multiple Medical Conditions       HPI:   Sánchez is a 78 y.o. male who a recent transfer from [facility] with admission on 12/10/18 and discharged on 12/20/18 who was admitted for an ischemic stroke.  He has a past medical history of Parkinson's disease who had bradykinesia though cognitively sharp who apparently became suddenly confused.  In the hospital he was diagnosed with a UTI which grown Pseudomonas, was treated with an IV cephalosporin for 3 days per ID consult.  Initial CT was negative though follow-up MRI showed left MCA territory stroke with moderate stroke burden.  He developed severe dysphagia with asphasia, evaluated several times by speech, had a video swallow study, though demonstrated no improvement.  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  Per discussion with family he would not want a feeding tube.  Also family does not recognize him as hospice candidate presently.  Unfortunately due to his stroke he had difficulty being discharged per intermittent agitation and wandering behavior.  He was socially seen by psychiatry and started on olanzapine with Seroquel.  He also was later transferred to the TCU for rehab and possible discussion per change in disposition given poor longterm outlook.    Today, he seems that issues have improved, though still having behavioral issues during the day, sleeping improved.  Also have noticed some ongoing hypotension with oral intake variable per modified diet. Patient denies pain, headache, chest pain, numbness or tingling, shortest of breath, eating or swallowing concerns, nausea or vomiting, diarrhea or bowel abnormalities, or no new integumentary concerns today.    Past  Medical History:  Past Medical History:   Diagnosis Date     Acute kidney injury (H)      BPH (benign prostatic hyperplasia)      Constipation      Enlarged prostate      Hypertension      Ischemic stroke (H) 2018     Lung nodule      Parkinson disease (H)      Urinary retention 2015           Surgical History:  Past Surgical History:   Procedure Laterality Date     CATARACT EXTRACTION       CYSTOSCOPY       CYSTOSCOPY PROSTATE W/ LASER N/A 3/2/2016    Procedure: CYSTOSCOPY, GREENLIGHT LASER TRANSURETHRAL RESECTION OF PROSTATE  ;  Surgeon: David Henning MD;  Location: [facility];  Service:      CYSTOSCOPY PROSTATE W/ LASER N/A 3/2/2016    Procedure: MONOPOLAR TRANSURETHRAL RESECTION OF PROSTATE;  Surgeon: David Henning MD;  Location: [facility];  Service:      EYE SURGERY      Cataract     FINGER SURGERY Left        Family History:   Family History   Problem Relation Age of Onset     Cancer Father          at age of 50     Lung cancer Mother          at age of 78     Clotting disorder Neg Hx        Social History:    Social History     Socioeconomic History     Marital status:      Spouse name: Not on file     Number of children: Not on file     Years of education: Not on file     Highest education level: Not on file   Social Needs     Financial resource strain: Not on file     Food insecurity - worry: Not on file     Food insecurity - inability: Not on file     Transportation needs - medical: Not on file     Transportation needs - non-medical: Not on file   Occupational History     Not on file   Tobacco Use     Smoking status: Current Every Day Smoker     Packs/day: 4.00     Years: 10.00     Pack years: 40.00     Types: Cigars     Smokeless tobacco: Never Used     Tobacco comment: cigars 4/day   Substance and Sexual Activity     Alcohol use: No     Drug use: No     Sexual activity: Not on file   Other Topics Concern     Not on file   Social History Narrative      and has two children           Review of Systems   Constitutional: Positive for activity change and appetite change. Negative for chills, diaphoresis, fatigue and fever.   HENT: Positive for hearing loss. Negative for congestion and facial swelling.    Eyes: Negative for photophobia, redness and visual disturbance.   Respiratory: Negative for cough, shortness of breath and stridor.    Cardiovascular: Negative.    Gastrointestinal: Positive for constipation. Negative for abdominal distention, abdominal pain, blood in stool, diarrhea and nausea.   Endocrine: Negative.    Genitourinary:        Incontinent   Musculoskeletal:        Denies pain   Skin:        intact   Allergic/Immunologic: Negative.    Neurological: Positive for speech difficulty. Negative for tremors, syncope and weakness.        Slow   Hematological: Negative.    Psychiatric/Behavioral: Positive for agitation, confusion and sleep disturbance.        Wandering at HS       Vitals:    12/30/18 1043   BP: 114/75   Pulse: 67   Resp: 18   Temp: 97  F (36.1  C)   SpO2: 95%   Weight: 195 lb (88.5 kg)       Physical Exam   Constitutional: No distress.   Sundowning, improved   HENT:   Head: Normocephalic and atraumatic.   Mouth/Throat: Oropharynx is clear and moist. No oropharyngeal exudate.   Neck: Normal range of motion. No JVD present. No tracheal deviation present.   intact   Cardiovascular: Normal rate, regular rhythm and normal heart sounds. Exam reveals no gallop and no friction rub.   No murmur heard.  Pulmonary/Chest: No stridor. No respiratory distress. He has no wheezes. He has no rales.   Dim, RA   Abdominal: Soft. Bowel sounds are normal. He exhibits no distension. There is no tenderness. There is no rebound.   Constipation, improved, known aspirator with speech following   Genitourinary:   Genitourinary Comments: incontient   Musculoskeletal: He exhibits no edema, tenderness or deformity.   Uses walker, no weakness noted    Lymphadenopathy:     He has no cervical adenopathy.   Neurological: He is alert.   A/O x1, no recall, asphasic, word finding issues   Skin: Skin is warm and dry. He is not diaphoretic.   intact   Psychiatric:   Agitation at times, wandering at HS, using zypreza and seroquel, less behaviors noted at HS       Medication List:  Current Outpatient Medications   Medication Sig     amLODIPine (NORVASC) 10 MG tablet Take 1 tablet (10 mg total) by mouth daily. (Patient taking differently: Take 10 mg by mouth daily. Hold for SBP <130      )     aspirin 81 mg chewable tablet Chew 1 tablet (81 mg total) daily.     atorvastatin (LIPITOR) 40 MG tablet Take 1 tablet (40 mg total) by mouth daily.     bisacodyl (DULCOLAX) 5 mg EC tablet Take 2 tablets (10 mg total) by mouth daily as needed for constipation.     carbidopa-levodopa (SINEMET)  mg per tablet Take 2 tablets by mouth 4 (four) times a day 0800, 1200, 1600, 2000.     melatonin 3 mg Tab tablet Take 1 tablet (3 mg total) by mouth at bedtime. insomnia (Patient taking differently: Take 6 mg by mouth at bedtime. insomnia      )     OLANZapine zydis (ZYPREXA) 5 MG disintegrating tablet Take 1 tablet (5 mg total) by mouth every 4 (four) hours as needed (agitation). (Patient taking differently: Take 5 mg by mouth at bedtime.       )     polyethylene glycol (MIRALAX) 17 gram packet Take 17 g by mouth daily.     QUEtiapine (SEROQUEL) 25 MG tablet Take 0.5 tablets (12.5 mg total) by mouth 3 (three) times a day. agitation (Patient taking differently: Take 25 mg by mouth 3 (three) times a day. agitation      )     QUEtiapine (SEROQUEL) 25 MG tablet Take 12.5 mg by mouth every 4 (four) hours as needed.     senna-docusate (PERICOLACE) 8.6-50 mg tablet Take 2 tablets by mouth 2 (two) times a day. constipation     traZODone (DESYREL) 50 MG tablet Take 100 mg by mouth at bedtime. And 50mg at 1400             Labs:  Recent Results (from the past 48 hour(s))   Basic Metabolic Panel     Collection Time: 12/29/18  1:47 PM   Result Value Ref Range    Sodium 137 136 - 145 mmol/L    Potassium 3.9 3.5 - 5.0 mmol/L    Chloride 99 98 - 107 mmol/L    CO2 27 22 - 31 mmol/L    Anion Gap, Calculation 11 5 - 18 mmol/L    Glucose 78 70 - 125 mg/dL    Calcium 8.9 8.5 - 10.5 mg/dL    BUN 57 (H) 8 - 28 mg/dL    Creatinine 2.39 (H) 0.70 - 1.30 mg/dL    GFR MDRD Af Amer 32 (L) >60 mL/min/1.73m2    GFR MDRD Non Af Amer 26 (L) >60 mL/min/1.73m2     Lab Results   Component Value Date    WBC 7.2 12/10/2018    HGB 14.4 12/10/2018    HCT 41.9 12/10/2018    MCV 94 12/10/2018     12/17/2018     Lab Results   Component Value Date    TSH 0.86 12/13/2018     No results found for: [id number]    No results found for: HGBA1C    Assessment/Plan:  Left MC a territory stroke with moderate stroke burden: Initially was not found on CT the later found on MRI follow-up.  Follow-up with neuro as directed.    Dysphasia: speech following, had swallow study, does not want feeding tube, continue mech soft with honey thick    UTI: Found to be pan sensitive E. coli, given a cephalosporin for 3 days, monitor.    Agitation with sundowning: Const lengthy discharge process, now on olanzapine 5 mg at bedtime, increase Seroquel 25 mg 3 times daily and now 12.5 every 4 hours as needed, increase trazodone 100mg at HS and keep 50mg at 1400.  Additionally add melatonin 6 mg at bedtime. Continuing to monitor sleep.    Constipation: Continue senna S 2 tabs daily, start MiraLAX daily hold for diarrhea    Hypertension without HF: Continue amlodipine 10 mg daily, hold for SBP less than 130. Will decrease HCTZ to 12.5 mg daily per hypotension and variable oral intake, recheck BMP    Medical management for stroke: Continue atorvastatin 40 mg and aspirin 81mg daily    Parkinson's disease: Continue Sinemet 50/100mg at 0800, 1200, 1600, 2000, no change    The care plan has been reviewed and all orders signed. Changes to care plan, if any, as noted.  Otherwise, continue care plan of care.  The total time spent with this patient was 25 minutes per tx for ongoing sundowning and hypotension.    Electronically signed by: Henry Harvey NP

## 2021-06-22 NOTE — PROGRESS NOTES
[facility]      Facility:    [facility] [[id number]]  Code Status: POLST AVAILABLE      Chief Complaint/Reason for Visit:           Chief Complaint   Patient presents with     Review Of Multiple Medical Conditions       HPI:   Sánchez is a 78 y.o. male who a recent transfer from [facility] with admission on 12/10/18 and discharged on 12/20/18 who was admitted for an ischemic stroke.  He has a past medical history of Parkinson's disease who had bradykinesia though cognitively sharp who apparently became suddenly confused.  In the hospital he was diagnosed with a UTI which grown Pseudomonas, was treated with an IV cephalosporin for 3 days per ID consult.  Initial CT was negative though follow-up MRI showed left MCA territory stroke with moderate stroke burden.  He developed severe dysphagia with asphasia, evaluated several times by speech, had a video swallow study, though demonstrated no improvement.  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  Per discussion with family he would not want a feeding tube.  Also family does not recognize him as hospice candidate presently.  Unfortunately due to his stroke he had difficulty being discharged per intermittent agitation and wandering behavior.  He was socially seen by psychiatry and started on olanzapine with Seroquel.  He also was later transferred to the TCU for rehab and possible discussion per change in disposition given poor longterm outlook.    Today I meet wife and son to have concerns about how long he will be in therapy, what can be done for his sundowning, and his periodic constipation.  Also have identified that he has hypotension. Patient denies pain, headache, chest pain, numbness or tingling, shortest of breath, eating or swallowing concerns, nausea or vomiting, diarrhea or bowel abnormalities, or no new integumentary concerns today.      Past  Medical History:  Past Medical History:   Diagnosis Date     Acute kidney injury (H)      BPH (benign prostatic hyperplasia)      Constipation      Enlarged prostate      Hypertension      Ischemic stroke (H) 2018     Lung nodule      Parkinson disease (H)      Urinary retention 2015           Surgical History:  Past Surgical History:   Procedure Laterality Date     CATARACT EXTRACTION       CYSTOSCOPY       CYSTOSCOPY PROSTATE W/ LASER N/A 3/2/2016    Procedure: CYSTOSCOPY, GREENLIGHT LASER TRANSURETHRAL RESECTION OF PROSTATE  ;  Surgeon: David Henning MD;  Location: [facility];  Service:      CYSTOSCOPY PROSTATE W/ LASER N/A 3/2/2016    Procedure: MONOPOLAR TRANSURETHRAL RESECTION OF PROSTATE;  Surgeon: David Henning MD;  Location: [facility];  Service:      EYE SURGERY      Cataract     FINGER SURGERY Left        Family History:   Family History   Problem Relation Age of Onset     Cancer Father          at age of 50     Lung cancer Mother          at age of 78     Clotting disorder Neg Hx        Social History:    Social History     Socioeconomic History     Marital status:      Spouse name: Not on file     Number of children: Not on file     Years of education: Not on file     Highest education level: Not on file   Social Needs     Financial resource strain: Not on file     Food insecurity - worry: Not on file     Food insecurity - inability: Not on file     Transportation needs - medical: Not on file     Transportation needs - non-medical: Not on file   Occupational History     Not on file   Tobacco Use     Smoking status: Current Every Day Smoker     Packs/day: 4.00     Years: 10.00     Pack years: 40.00     Types: Cigars     Smokeless tobacco: Never Used     Tobacco comment: cigars 4/day   Substance and Sexual Activity     Alcohol use: No     Drug use: No     Sexual activity: Not on file   Other Topics Concern     Not on file   Social History Narrative      and has two children           Review of Systems   Constitutional: Positive for activity change and appetite change. Negative for chills, diaphoresis, fatigue and fever.   HENT: Positive for hearing loss. Negative for congestion and facial swelling.    Eyes: Negative for photophobia, redness and visual disturbance.   Respiratory: Positive for shortness of breath. Negative for cough and stridor.    Cardiovascular: Negative.    Gastrointestinal: Positive for constipation. Negative for abdominal distention, abdominal pain, blood in stool, diarrhea and nausea.   Endocrine: Negative.    Genitourinary:        Incontinent   Musculoskeletal:        Denies pain   Skin:        intact   Allergic/Immunologic: Negative.    Neurological: Positive for speech difficulty. Negative for tremors, syncope and weakness.   Hematological: Negative.    Psychiatric/Behavioral: Positive for agitation, confusion and sleep disturbance.        Wandering at HS       Vitals:    12/22/18 1129   BP: 97/69   Pulse: 74   Resp: 18   Temp: 98  F (36.7  C)   SpO2: 96%   Weight: 196 lb (88.9 kg)       Physical Exam   Constitutional: No distress.   sundowning   HENT:   Head: Normocephalic and atraumatic.   Mouth/Throat: Oropharynx is clear and moist. No oropharyngeal exudate.   Neck: Normal range of motion. No JVD present. No tracheal deviation present.   intact   Cardiovascular: Normal rate, regular rhythm and normal heart sounds. Exam reveals no gallop and no friction rub.   No murmur heard.  Pulmonary/Chest: No stridor. No respiratory distress. He has no wheezes. He has no rales.   Dim, RA   Abdominal: Soft. Bowel sounds are normal. He exhibits no distension. There is no tenderness. There is no rebound.   Has constipation, known aspirator   Genitourinary:   Genitourinary Comments: incontient   Musculoskeletal: He exhibits no edema, tenderness or deformity.   Uses walker, no weakness noted   Lymphadenopathy:     He has no cervical adenopathy.    Neurological: He is alert.   A/O x1, no recall, asphasic, word finding issues   Skin: Skin is warm and dry. He is not diaphoretic.   intact   Psychiatric:   Agitation at times, wandering at HS, using zypreza and seroquel       Medication List:  Current Outpatient Medications   Medication Sig     polyethylene glycol (MIRALAX) 17 gram packet Take 17 g by mouth daily.     QUEtiapine (SEROQUEL) 25 MG tablet Take 12.5 mg by mouth every 4 (four) hours as needed.     traZODone (DESYREL) 50 MG tablet Take 50 mg by mouth 2 (two) times a day. Give at 1400 and HS     amLODIPine (NORVASC) 10 MG tablet Take 1 tablet (10 mg total) by mouth daily. (Patient taking differently: Take 10 mg by mouth daily. Hold for SBP <130      )     aspirin 81 mg chewable tablet Chew 1 tablet (81 mg total) daily.     atorvastatin (LIPITOR) 40 MG tablet Take 1 tablet (40 mg total) by mouth daily.     bisacodyl (DULCOLAX) 5 mg EC tablet Take 2 tablets (10 mg total) by mouth daily as needed for constipation.     carbidopa-levodopa (SINEMET)  mg per tablet Take 2 tablets by mouth 4 (four) times a day 0800, 1200, 1600, 2000.     hydroCHLOROthiazide (HYDRODIURIL) 25 MG tablet Take 1 tablet (25 mg total) by mouth daily.     melatonin 3 mg Tab tablet Take 1 tablet (3 mg total) by mouth at bedtime. insomnia (Patient taking differently: Take 6 mg by mouth at bedtime. insomnia      )     OLANZapine zydis (ZYPREXA) 5 MG disintegrating tablet Take 1 tablet (5 mg total) by mouth every 4 (four) hours as needed (agitation). (Patient taking differently: Take 5 mg by mouth at bedtime.       )     QUEtiapine (SEROQUEL) 25 MG tablet Take 0.5 tablets (12.5 mg total) by mouth 3 (three) times a day. agitation     senna-docusate (PERICOLACE) 8.6-50 mg tablet Take 2 tablets by mouth 2 (two) times a day. constipation       Labs:  Results for orders placed or performed during the hospital encounter of 12/10/18   Basic Metabolic Panel   Result Value Ref Range    Sodium  136 136 - 145 mmol/L    Potassium 3.4 (L) 3.5 - 5.0 mmol/L    Chloride 105 98 - 107 mmol/L    CO2 21 (L) 22 - 31 mmol/L    Anion Gap, Calculation 10 5 - 18 mmol/L    Glucose 90 70 - 125 mg/dL    Calcium 8.7 8.5 - 10.5 mg/dL    BUN 25 8 - 28 mg/dL    Creatinine 0.85 0.70 - 1.30 mg/dL    GFR MDRD Af Amer >60 >60 mL/min/1.73m2    GFR MDRD Non Af Amer >60 >60 mL/min/1.73m2     Lab Results   Component Value Date    WBC 7.2 12/10/2018    HGB 14.4 12/10/2018    HCT 41.9 12/10/2018    MCV 94 12/10/2018     12/17/2018     Lab Results   Component Value Date    TSH 0.86 12/13/2018     No results found for: [id number]    No results found for: HGBA1C    Assessment/Plan:  Left MC a territory stroke with moderate stroke burden: Initially was not found on CT the later found on MRI follow-up.  Follow-up with neuro as directed.    Dysphasia: Have speech to evaluate and treat, had swallow study, does not want feeding tube    UTI: Found to be pan sensitive E. coli, given a cephalosporin for 3 days, monitor.    Agitation with sundowning: Const lengthy discharge process, now on olanzapine 5 mg at bedtime, Seroquel 12.5 mg 3 times daily and now every 4 hours as needed, start trazodone 50 mg at 1400 and at bedtime.  Additionally add melatonin 6 mg at bedtime.  Monitor sleep for 5 days.     Constipation: Continue senna S2 tabs daily, start MiraLAX daily hold for diarrhea    Hypertension: Continue amlodipine 10 mg daily, hold for SBP  less than 130.  Continue HCTZ 25 mg daily    Medical management for stroke: Continue atorvastatin 40 mg and aspirin 81mg daily    Parkinson's disease: Continue Sinemet 50/100mg at 0800, 1200, 1600, 2000    The care plan has been reviewed and all orders signed. Changes to care plan, if any, as noted. Otherwise, continue care plan of care.  The total time spent with this patient was 50 minutes, with greater than 50% spent in counseling and coordination of care that included multiple issues with family per  therapy and tx for ongoing sundowning.    Electronically signed by: Henry Harvey NP

## 2021-06-22 NOTE — TELEPHONE ENCOUNTER
Clinic Care Coordination Contact    Situation: Patient chart reviewed by care coordinator.    Background: patient is living at the [facility].     Assessment: No need for care coordination.    Plan/Recommendations: no outreach by CC will be completed.    Blanca Logan,   [facility]  [email or web address]  [phone]